# Patient Record
Sex: FEMALE | Race: WHITE | NOT HISPANIC OR LATINO | Employment: FULL TIME | URBAN - NONMETROPOLITAN AREA
[De-identification: names, ages, dates, MRNs, and addresses within clinical notes are randomized per-mention and may not be internally consistent; named-entity substitution may affect disease eponyms.]

---

## 2021-12-25 ENCOUNTER — APPOINTMENT (EMERGENCY)
Dept: RADIOLOGY | Facility: HOSPITAL | Age: 41
End: 2021-12-25
Payer: COMMERCIAL

## 2021-12-25 ENCOUNTER — HOSPITAL ENCOUNTER (EMERGENCY)
Facility: HOSPITAL | Age: 41
Discharge: HOME | End: 2021-12-25
Attending: EMERGENCY MEDICINE
Payer: COMMERCIAL

## 2021-12-25 VITALS
TEMPERATURE: 98.7 F | WEIGHT: 153.22 LBS | HEIGHT: 67 IN | SYSTOLIC BLOOD PRESSURE: 178 MMHG | HEART RATE: 68 BPM | RESPIRATION RATE: 18 BRPM | DIASTOLIC BLOOD PRESSURE: 95 MMHG | BODY MASS INDEX: 24.05 KG/M2 | OXYGEN SATURATION: 100 %

## 2021-12-25 DIAGNOSIS — J02.9 VIRAL PHARYNGITIS: Primary | ICD-10-CM

## 2021-12-25 LAB
FLUAV RNA RESP QL NAA+PROBE: NEGATIVE
FLUBV RNA RESP QL NAA+PROBE: NEGATIVE
RSV RNA RESP QL NAA+PROBE: NEGATIVE
S PYO DNA THROAT QL NAA+PROBE: NORMAL
SARS-COV-2 RNA RESP QL NAA+PROBE: NEGATIVE

## 2021-12-25 PROCEDURE — 71046 X-RAY EXAM CHEST 2 VIEWS: CPT

## 2021-12-25 PROCEDURE — 99285 EMERGENCY DEPT VISIT HI MDM: CPT | Performed by: EMERGENCY MEDICINE

## 2021-12-25 PROCEDURE — 0241U HB NFCT DS VIR RESP RNA 4 TRGT: CPT | Performed by: EMERGENCY MEDICINE

## 2021-12-25 PROCEDURE — 99283 EMERGENCY DEPT VISIT LOW MDM: CPT

## 2021-12-25 PROCEDURE — 87651 STREP A DNA AMP PROBE: CPT | Performed by: EMERGENCY MEDICINE

## 2021-12-25 PROCEDURE — 96372 THER/PROPH/DIAG INJ SC/IM: CPT

## 2021-12-25 RX ORDER — KETOROLAC TROMETHAMINE 30 MG/ML
30 INJECTION, SOLUTION INTRAMUSCULAR; INTRAVENOUS ONCE
Status: COMPLETED | OUTPATIENT
Start: 2021-12-25 | End: 2021-12-25

## 2021-12-25 RX ORDER — DIPHENOXYLATE HYDROCHLORIDE AND ATROPINE SULFATE 2.5; .025 MG/1; MG/1
1 TABLET ORAL DAILY
COMMUNITY

## 2021-12-25 RX ORDER — IPRATROPIUM BROMIDE AND ALBUTEROL SULFATE 2.5; .5 MG/3ML; MG/3ML
3 SOLUTION RESPIRATORY (INHALATION) ONCE
Status: COMPLETED | OUTPATIENT
Start: 2021-12-25 | End: 2021-12-25

## 2021-12-25 RX ORDER — LACOSAMIDE 50 MG/1
100 TABLET ORAL 2 TIMES DAILY
COMMUNITY
Start: 2021-11-04 | End: 2022-11-04

## 2021-12-25 RX ADMIN — IPRATROPIUM BROMIDE AND ALBUTEROL SULFATE 3 ML: .5; 3 SOLUTION RESPIRATORY (INHALATION) at 15:50

## 2021-12-25 RX ADMIN — KETOROLAC TROMETHAMINE 30 MG: 30 INJECTION, SOLUTION INTRAMUSCULAR at 15:41

## 2022-04-05 ENCOUNTER — APPOINTMENT (EMERGENCY)
Dept: CT IMAGING | Facility: HOSPITAL | Age: 42
End: 2022-04-05
Payer: COMMERCIAL

## 2022-04-05 ENCOUNTER — HOSPITAL ENCOUNTER (EMERGENCY)
Facility: HOSPITAL | Age: 42
Discharge: HOME | End: 2022-04-05
Attending: EMERGENCY MEDICINE
Payer: COMMERCIAL

## 2022-04-05 VITALS
OXYGEN SATURATION: 99 % | TEMPERATURE: 98.1 F | DIASTOLIC BLOOD PRESSURE: 92 MMHG | HEART RATE: 56 BPM | WEIGHT: 172.18 LBS | BODY MASS INDEX: 26.97 KG/M2 | RESPIRATION RATE: 18 BRPM | SYSTOLIC BLOOD PRESSURE: 150 MMHG

## 2022-04-05 DIAGNOSIS — G40.909 SEIZURE DISORDER (HCC): Primary | ICD-10-CM

## 2022-04-05 DIAGNOSIS — G40.909 RECURRENT SEIZURES (HCC): ICD-10-CM

## 2022-04-05 LAB
ALBUMIN SERPL BCP-MCNC: 4 G/DL (ref 3.5–5)
ALP SERPL-CCNC: 55 U/L (ref 46–116)
ALT SERPL W P-5'-P-CCNC: 20 U/L (ref 12–78)
ANION GAP SERPL CALCULATED.3IONS-SCNC: 7 MMOL/L (ref 4–13)
APTT PPP: 32 SECONDS (ref 23–37)
AST SERPL W P-5'-P-CCNC: 16 U/L (ref 5–45)
BASOPHILS # BLD AUTO: 0.04 THOUSANDS/ΜL (ref 0–0.1)
BASOPHILS NFR BLD AUTO: 1 % (ref 0–1)
BILIRUB SERPL-MCNC: 0.31 MG/DL (ref 0.2–1)
BILIRUB UR QL STRIP: NEGATIVE
BUN SERPL-MCNC: 12 MG/DL (ref 5–25)
CALCIUM SERPL-MCNC: 8.3 MG/DL (ref 8.3–10.1)
CHLORIDE SERPL-SCNC: 104 MMOL/L (ref 100–108)
CLARITY UR: CLEAR
CO2 SERPL-SCNC: 27 MMOL/L (ref 21–32)
COLOR UR: YELLOW
CREAT SERPL-MCNC: 1.05 MG/DL (ref 0.6–1.3)
EOSINOPHIL # BLD AUTO: 0.04 THOUSAND/ΜL (ref 0–0.61)
EOSINOPHIL NFR BLD AUTO: 1 % (ref 0–6)
ERYTHROCYTE [DISTWIDTH] IN BLOOD BY AUTOMATED COUNT: 12.6 % (ref 11.6–15.1)
EXT PREG TEST URINE: NEGATIVE
EXT. CONTROL ED NAV: NORMAL
GFR SERPL CREATININE-BSD FRML MDRD: 65 ML/MIN/1.73SQ M
GLUCOSE SERPL-MCNC: 81 MG/DL (ref 65–140)
GLUCOSE SERPL-MCNC: 87 MG/DL (ref 65–140)
GLUCOSE UR STRIP-MCNC: NEGATIVE MG/DL
HCT VFR BLD AUTO: 43.8 % (ref 34.8–46.1)
HGB BLD-MCNC: 14.1 G/DL (ref 11.5–15.4)
HGB UR QL STRIP.AUTO: NEGATIVE
IMM GRANULOCYTES # BLD AUTO: 0.03 THOUSAND/UL (ref 0–0.2)
IMM GRANULOCYTES NFR BLD AUTO: 0 % (ref 0–2)
INR PPP: 1.02 (ref 0.84–1.19)
KETONES UR STRIP-MCNC: ABNORMAL MG/DL
LEUKOCYTE ESTERASE UR QL STRIP: NEGATIVE
LYMPHOCYTES # BLD AUTO: 2.15 THOUSANDS/ΜL (ref 0.6–4.47)
LYMPHOCYTES NFR BLD AUTO: 27 % (ref 14–44)
MCH RBC QN AUTO: 29.5 PG (ref 26.8–34.3)
MCHC RBC AUTO-ENTMCNC: 32.2 G/DL (ref 31.4–37.4)
MCV RBC AUTO: 92 FL (ref 82–98)
MONOCYTES # BLD AUTO: 0.54 THOUSAND/ΜL (ref 0.17–1.22)
MONOCYTES NFR BLD AUTO: 7 % (ref 4–12)
NEUTROPHILS # BLD AUTO: 5.13 THOUSANDS/ΜL (ref 1.85–7.62)
NEUTS SEG NFR BLD AUTO: 64 % (ref 43–75)
NITRITE UR QL STRIP: NEGATIVE
NRBC BLD AUTO-RTO: 0 /100 WBCS
PH UR STRIP.AUTO: 7 [PH]
PLATELET # BLD AUTO: 238 THOUSANDS/UL (ref 149–390)
PMV BLD AUTO: 11 FL (ref 8.9–12.7)
POTASSIUM SERPL-SCNC: 3.8 MMOL/L (ref 3.5–5.3)
PROT SERPL-MCNC: 7.1 G/DL (ref 6.4–8.2)
PROT UR STRIP-MCNC: NEGATIVE MG/DL
PROTHROMBIN TIME: 13.3 SECONDS (ref 11.6–14.5)
RBC # BLD AUTO: 4.78 MILLION/UL (ref 3.81–5.12)
SODIUM SERPL-SCNC: 138 MMOL/L (ref 136–145)
SP GR UR STRIP.AUTO: 1.02 (ref 1–1.03)
UROBILINOGEN UR QL STRIP.AUTO: 0.2 E.U./DL
WBC # BLD AUTO: 7.93 THOUSAND/UL (ref 4.31–10.16)

## 2022-04-05 PROCEDURE — 72125 CT NECK SPINE W/O DYE: CPT

## 2022-04-05 PROCEDURE — 93005 ELECTROCARDIOGRAM TRACING: CPT

## 2022-04-05 PROCEDURE — 81003 URINALYSIS AUTO W/O SCOPE: CPT | Performed by: EMERGENCY MEDICINE

## 2022-04-05 PROCEDURE — 85025 COMPLETE CBC W/AUTO DIFF WBC: CPT | Performed by: EMERGENCY MEDICINE

## 2022-04-05 PROCEDURE — 70450 CT HEAD/BRAIN W/O DYE: CPT

## 2022-04-05 PROCEDURE — 85610 PROTHROMBIN TIME: CPT | Performed by: EMERGENCY MEDICINE

## 2022-04-05 PROCEDURE — 36415 COLL VENOUS BLD VENIPUNCTURE: CPT | Performed by: EMERGENCY MEDICINE

## 2022-04-05 PROCEDURE — 99285 EMERGENCY DEPT VISIT HI MDM: CPT

## 2022-04-05 PROCEDURE — 96361 HYDRATE IV INFUSION ADD-ON: CPT

## 2022-04-05 PROCEDURE — 80053 COMPREHEN METABOLIC PANEL: CPT | Performed by: EMERGENCY MEDICINE

## 2022-04-05 PROCEDURE — 85730 THROMBOPLASTIN TIME PARTIAL: CPT | Performed by: EMERGENCY MEDICINE

## 2022-04-05 PROCEDURE — 81025 URINE PREGNANCY TEST: CPT | Performed by: EMERGENCY MEDICINE

## 2022-04-05 PROCEDURE — 82948 REAGENT STRIP/BLOOD GLUCOSE: CPT

## 2022-04-05 PROCEDURE — 96374 THER/PROPH/DIAG INJ IV PUSH: CPT

## 2022-04-05 PROCEDURE — 80235 DRUG ASSAY LACOSAMIDE: CPT | Performed by: EMERGENCY MEDICINE

## 2022-04-05 PROCEDURE — 99284 EMERGENCY DEPT VISIT MOD MDM: CPT | Performed by: EMERGENCY MEDICINE

## 2022-04-05 RX ORDER — MIDAZOLAM HYDROCHLORIDE 1 MG/ML
1 INJECTION INTRAMUSCULAR; INTRAVENOUS ONCE
Status: COMPLETED | OUTPATIENT
Start: 2022-04-05 | End: 2022-04-05

## 2022-04-05 RX ORDER — KETOROLAC TROMETHAMINE 30 MG/ML
30 INJECTION, SOLUTION INTRAMUSCULAR; INTRAVENOUS ONCE
Status: COMPLETED | OUTPATIENT
Start: 2022-04-05 | End: 2022-04-05

## 2022-04-05 RX ADMIN — KETOROLAC TROMETHAMINE 30 MG: 30 INJECTION, SOLUTION INTRAMUSCULAR at 12:45

## 2022-04-05 RX ADMIN — SODIUM CHLORIDE 1000 ML: 0.9 INJECTION, SOLUTION INTRAVENOUS at 11:09

## 2022-04-05 NOTE — ED NOTES
This RN entered room, EMS not in room, pt left unattended, EMS had left pt in room prior to giving report  Pt found to be unresponsive only to painful stimuli, EMS then found for report and EMS reports pt had been given 2mg versed due to having seizure en-route  Pt post-ictal and only responds verbally after repeated painful stimulation  Charge called and asked if report was given, per Joaquina Aburto RN no report was given to him either          Kayla Kline, TIBURCIO  04/05/22 5714

## 2022-04-05 NOTE — ED PROVIDER NOTES
History  Chief Complaint   Patient presents with    Seizure - Prior Hx Of     has seizure history, has not had one for over a year, today had one at work  slid down to the ground and now has neck pain  Patient is a 43-year-old female with history of epilepsy who presents the emergency department due to seizure which occurred at work today patient had a generalized seizures lump back in chair and fell to the ground upon EMS arrival she did complain of neck pain no other trauma or injury  The patient did have a subsequent seizure in route to hospital with EMS was given 2 mg of Versed and seizure has  Patient is now tired but alert and responsive and oriented x3  History provided by:  Patient and EMS personnel  Seizure Re-Evaluation  Seizure activity on arrival: no    Seizure type:  Grand mal  Postictal symptoms: somnolence    Return to baseline: yes    Severity:  Moderate  Duration:  1 minute  Timing:  Clustered  Number of seizures this episode:  2  Context: not change in medication, not sleeping less and medical compliance    Recent head injury:  During the event  PTA treatment:  Midazolam  History of seizures: yes        Prior to Admission Medications   Prescriptions Last Dose Informant Patient Reported? Taking?   lacosamide (VIMPAT) 50 mg   Yes No   Sig: Take 100 mg by mouth 2 (two) times a day   multivitamin (THERAGRAN) TABS   Yes No   Sig: Take 1 tablet by mouth daily      Facility-Administered Medications: None       Past Medical History:   Diagnosis Date    Epilepsy ([facility] )     Seizure ([facility] )        History reviewed  No pertinent surgical history  History reviewed  No pertinent family history  I have reviewed and agree with the history as documented      E-Cigarette/Vaping    E-Cigarette Use Never User      E-Cigarette/Vaping Substances     Social History     Tobacco Use    Smoking status: Never Smoker    Smokeless tobacco: Never Used   Vaping Use    Vaping Use: Never used   Substance Use Topics    Alcohol use: Not Currently    Drug use: Not Currently       Review of Systems   Constitutional: Negative for activity change, appetite change, chills, fatigue and fever  HENT: Negative for congestion, ear pain, rhinorrhea and sore throat  Eyes: Negative for discharge, redness and visual disturbance  Respiratory: Negative for cough, chest tightness, shortness of breath and wheezing  Cardiovascular: Negative for chest pain and palpitations  Gastrointestinal: Negative for abdominal pain, constipation, diarrhea, nausea and vomiting  Endocrine: Negative for polydipsia and polyuria  Genitourinary: Negative for difficulty urinating, dysuria, frequency, hematuria and urgency  Musculoskeletal: Positive for neck pain  Negative for arthralgias and myalgias  Skin: Negative for color change, pallor and rash  Neurological: Positive for seizures  Negative for dizziness, weakness, light-headedness, numbness and headaches  Hematological: Negative for adenopathy  Does not bruise/bleed easily  All other systems reviewed and are negative  Physical Exam  Physical Exam  Vitals and nursing note reviewed  Constitutional:       Appearance: She is well-developed  HENT:      Head: Normocephalic and atraumatic  Right Ear: External ear normal       Left Ear: External ear normal       Nose: Nose normal    Eyes:      Conjunctiva/sclera: Conjunctivae normal       Pupils: Pupils are equal, round, and reactive to light  Cardiovascular:      Rate and Rhythm: Normal rate and regular rhythm  Heart sounds: Normal heart sounds  Pulmonary:      Effort: Pulmonary effort is normal  No respiratory distress  Breath sounds: Normal breath sounds  No wheezing or rales  Chest:      Chest wall: No tenderness  Abdominal:      General: Bowel sounds are normal  There is no distension  Palpations: Abdomen is soft  Tenderness: There is no abdominal tenderness  There is no guarding  Musculoskeletal:         General: Normal range of motion  Cervical back: Normal range of motion and neck supple  Muscular tenderness present  No spinous process tenderness  Skin:     General: Skin is warm and dry  Neurological:      Mental Status: She is alert and oriented to person, place, and time  Cranial Nerves: No cranial nerve deficit  Sensory: No sensory deficit           Vital Signs  ED Triage Vitals   Temperature Pulse Respirations Blood Pressure SpO2   04/05/22 1200 04/05/22 1048 04/05/22 1105 04/05/22 1048 04/05/22 1048   98 1 °F (36 7 °C) 87 18 (!) 189/101 97 %      Temp Source Heart Rate Source Patient Position - Orthostatic VS BP Location FiO2 (%)   04/05/22 1200 -- -- -- --   Temporal          Pain Score       --                  Vitals:    04/05/22 1048 04/05/22 1105 04/05/22 1200   BP: (!) 189/101  134/78   Pulse: 87 65 (!) 54         Visual Acuity      ED Medications  Medications   ketorolac (TORADOL) injection 30 mg (has no administration in time range)   sodium chloride 0 9 % bolus 1,000 mL (1,000 mL Intravenous New Bag 4/5/22 1109)   midazolam (FOR EMS ONLY) (VERSED) 2 mg/2 mL injection 2 mg (0 mg Does not apply Given to EMS 4/5/22 1100)       Diagnostic Studies  Results Reviewed     Procedure Component Value Units Date/Time    UA w Reflex to Microscopic w Reflex to Culture [[id number]]  (Abnormal) Collected: 04/05/22 1102    Lab Status: Final result Specimen: Urine Updated: 04/05/22 1142     Color, UA Yellow     Clarity, UA Clear     Specific Peck, UA 1 020     pH, UA 7 0     Leukocytes, UA Negative     Nitrite, UA Negative     Protein, UA Negative mg/dl      Glucose, UA Negative mg/dl      Ketones, UA Trace mg/dl      Urobilinogen, UA 0 2 E U /dl      Bilirubin, UA Negative     Blood, UA Negative    POCT pregnancy, urine [[id number]]  (Normal) Resulted: 04/05/22 1135    Lab Status: Final result Updated: 04/05/22 1135     EXT PREG TEST UR (Ref: Negative) negative Control valid    Comprehensive metabolic panel [[id number]] Collected: 04/05/22 1107    Lab Status: Final result Specimen: Blood from Arm, Left Updated: 04/05/22 1130     Sodium 138 mmol/L      Potassium 3 8 mmol/L      Chloride 104 mmol/L      CO2 27 mmol/L      ANION GAP 7 mmol/L      BUN 12 mg/dL      Creatinine 1 05 mg/dL      Glucose 87 mg/dL      Calcium 8 3 mg/dL      AST 16 U/L      ALT 20 U/L      Alkaline Phosphatase 55 U/L      Total Protein 7 1 g/dL      Albumin 4 0 g/dL      Total Bilirubin 0 31 mg/dL      eGFR 65 ml/min/1 73sq m     Narrative:      National Kid[employer] Disease [employer] guidelines for Chronic Kidney Disease (CKD):     Stage 1 with normal or high GFR (GFR > 90 mL/min/1 73 square meters)    Stage 2 Mild CKD (GFR = 60-89 mL/min/1 73 square meters)    Stage 3A Moderate CKD (GFR = 45-59 mL/min/1 73 square meters)    Stage 3B Moderate CKD (GFR = 30-44 mL/min/1 73 square meters)    Stage 4 Severe CKD (GFR = 15-29 mL/min/1 73 square meters)    Stage 5 End Stage CKD (GFR <15 mL/min/1 73 square meters)  Note: GFR calculation is accurate only with a steady state creatinine    Protime-INR [[id number]]  (Normal) Collected: 04/05/22 1107    Lab Status: Final result Specimen: Blood from Arm, Left Updated: 04/05/22 1126     Protime 13 3 seconds      INR 1 02    APTT [[id number]]  (Normal) Collected: 04/05/22 1107    Lab Status: Final result Specimen: Blood from Arm, Left Updated: 04/05/22 1126     PTT 32 seconds     CBC and differential [[id number]] Collected: 04/05/22 1107    Lab Status: Final result Specimen: Blood from Arm, Left Updated: 04/05/22 1112     WBC 7 93 Thousand/uL      RBC 4 78 Million/uL      Hemoglobin 14 1 g/dL      Hematocrit 43 8 %      MCV 92 fL      MCH 29 5 pg      MCHC 32 2 g/dL      RDW 12 6 %      MPV 11 0 fL      Platelets 785 Thousands/uL      nRBC 0 /100 WBCs      Neutrophils Relative 64 %      Immat GRANS % 0 %      Lymphocytes Relative 27 %      Monocytes Relative 7 % Eosinophils Relative 1 %      Basophils Relative 1 %      Neutrophils Absolute 5 13 Thousands/µL      Immature Grans Absolute 0 03 Thousand/uL      Lymphocytes Absolute 2 15 Thousands/µL      Monocytes Absolute 0 54 Thousand/µL      Eosinophils Absolute 0 04 Thousand/µL      Basophils Absolute 0 04 Thousands/µL     Lacosamide [[id number]] Collected: 04/05/22 1107    Lab Status: In process Specimen: Blood from Arm, Left Updated: 04/05/22 1108    Fingerstick Glucose (POCT) [[id number]]  (Normal) Collected: 04/05/22 1056    Lab Status: Final result Updated: 04/05/22 1056     POC Glucose 81 mg/dl                  CT head without contrast   Final Result by Rich Spear MD (04/05 1158)      No acute intracranial abnormality  Workstation performed: [id number]         CT cervical spine without contrast   Final Result by Rich Spear MD (04/05 1200)      No cervical spine fracture or traumatic malalignment  Workstation performed: [id number]                    Procedures  Procedures         ED Course  ED Course as of 04/05/22 1215   Tue Apr 05, 2022   1208 Cervical Collar Clearance: The patient had a CT scan of the cervical spine demonstrating no acute injury  On exam, the patient had no midline point tenderness or paresthesias/numbness/weakness in the extremities  The patient had full range of motion (was then able to flex, extend, and rotate head laterally) without pain  There were no distracting injuries and the patient was not intoxicated  The patient's cervical spine was cleared radiologically and clinically  Cervical collar removed at this time  Ellyn Parson DO  4/5/2022 12:08 PM                                    SBIRT 20yo+      Most Recent Value   SBIRT (25 yo +)    In order to provide better care to our patients, we are screening all of our patients for alcohol and drug use  Would it be okay to ask you these screening questions?  Yes Filed at: 04/05/2022 1108   Initial Alcohol Screen: US AUDIT-C     1  How often do you have a drink containing alcohol? 0 Filed at: 04/05/2022 1108   2  How many drinks containing alcohol do you have on a typical day you are drinking? 0 Filed at: 04/05/2022 1108   3a  Male UNDER 65: How often do you have five or more drinks on one occasion? 0 Filed at: 04/05/2022 1108   3b  FEMALE Any Age, or MALE 65+: How often do you have 4 or more drinks on one occassion? 0 Filed at: 04/05/2022 1108   Audit-C Score 0 Filed at: 04/05/2022 1108   DAVIDA: How many times in the past year have you    Used an illegal drug or used a prescription medication for non-medical reasons? Never Filed at: 04/05/2022 1108                    MDM  Number of Diagnoses or Management Options  Recurrent seizures [facility]): new and requires workup  Seizure disorder [facility]): new and requires workup  Diagnosis management comments: Patient remained clinically and hemodynamically stable in the emergency department she is afebrile nontoxic well-appearing mental status has returned to baseline she has some aches and pains all over from the seizure activity no further seizure activity throughout observation monitoring in the ED  Patient now feels improved and remained stable and appropriate for discharge advised outpatient follow-up with PCP and Neurology and continue all current prescribe seizure meds  Based on clinical scenario and results and findings in the ED as well as prior EEG testing doubt true epileptic seizure  Return precautions and anticipatory guidance discussed           Amount and/or Complexity of Data Reviewed  Clinical lab tests: ordered and reviewed  Tests in the radiology section of CPT®: ordered and reviewed  Tests in the medicine section of CPT®: ordered and reviewed  Decide to obtain previous medical records or to obtain history from someone other than the patient: yes  Review and summarize past medical records: yes  Independent visualization of images, tracings, or specimens: yes    Risk of Complications, Morbidity, and/or Mortality  Presenting problems: moderate  Diagnostic procedures: moderate  Management options: moderate    Patient Progress  Patient progress: stable      Disposition  Final diagnoses:   Seizure disorder ([facility] )   Recurrent seizures ([facility] )     Time reflects when diagnosis was documented in both MDM as applicable and the Disposition within this note     Time User Action Codes Description Comment    4/5/2022 12:09 PM Ephriam Orchard [[id number]] Seizure disorder ([facility] )     4/5/2022 12:09 PM David Hassankathi Add [[id number]] Recurrent seizures [facility])       ED Disposition     ED Disposition Condition Date/Time Comment    Discharge Stable Tue Apr 5, 2022 12:09 PM Yrn Carty discharge to home/self care  Follow-up Information     Follow up With Specialties Details Why Contact Info    Raymond Driver MD  Schedule an appointment as soon as possible for a visit in 3 days  [address] [address]  [phone]        Schedule an appointment as soon as possible for a visit in 3 days  Your neurologist          Patient's Medications   Discharge Prescriptions    No medications on file       No discharge procedures on file      PDMP Review     None          ED Provider  Electronically Signed by           Vera Cuadra DO  04/05/22 1354

## 2022-04-10 LAB — LACOSAMIDE SERPL-MCNC: 2.6 UG/ML (ref 5–10)

## 2022-04-11 LAB
ATRIAL RATE: 250 BPM
P AXIS: 61 DEGREES
QRS AXIS: 66 DEGREES
QRSD INTERVAL: 86 MS
QT INTERVAL: 394 MS
QTC INTERVAL: 431 MS
T WAVE AXIS: 34 DEGREES
VENTRICULAR RATE: 72 BPM

## 2022-04-11 PROCEDURE — 93010 ELECTROCARDIOGRAM REPORT: CPT | Performed by: INTERNAL MEDICINE

## 2023-05-04 ENCOUNTER — HOSPITAL ENCOUNTER (OUTPATIENT)
Facility: HOSPITAL | Age: 43
Setting detail: OBSERVATION
Discharge: HOME | End: 2023-05-05
Attending: EMERGENCY MEDICINE | Admitting: FAMILY MEDICINE

## 2023-05-04 ENCOUNTER — APPOINTMENT (OUTPATIENT)
Dept: CT IMAGING | Facility: HOSPITAL | Age: 43
End: 2023-05-04

## 2023-05-04 DIAGNOSIS — G40.919 BREAKTHROUGH SEIZURE (HCC): ICD-10-CM

## 2023-05-04 DIAGNOSIS — R56.9 SEIZURE-LIKE ACTIVITY (HCC): Primary | ICD-10-CM

## 2023-05-04 LAB
ALBUMIN SERPL BCP-MCNC: 4.2 G/DL (ref 3.5–5)
ALP SERPL-CCNC: 50 U/L (ref 34–104)
ALT SERPL W P-5'-P-CCNC: 14 U/L (ref 7–52)
ANION GAP SERPL CALCULATED.3IONS-SCNC: 7 MMOL/L (ref 4–13)
APTT PPP: 32 SECONDS (ref 23–37)
AST SERPL W P-5'-P-CCNC: 16 U/L (ref 13–39)
BASOPHILS # BLD AUTO: 0.06 THOUSANDS/ÂΜL (ref 0–0.1)
BASOPHILS NFR BLD AUTO: 1 % (ref 0–1)
BILIRUB SERPL-MCNC: 0.43 MG/DL (ref 0.2–1)
BILIRUB UR QL STRIP: NEGATIVE
BUN SERPL-MCNC: 14 MG/DL (ref 5–25)
CALCIUM SERPL-MCNC: 9.1 MG/DL (ref 8.4–10.2)
CHLORIDE SERPL-SCNC: 106 MMOL/L (ref 96–108)
CK SERPL-CCNC: 82 U/L (ref 26–192)
CLARITY UR: CLEAR
CO2 SERPL-SCNC: 25 MMOL/L (ref 21–32)
COLOR UR: YELLOW
CREAT SERPL-MCNC: 0.92 MG/DL (ref 0.6–1.3)
EOSINOPHIL # BLD AUTO: 0.1 THOUSAND/ÂΜL (ref 0–0.61)
EOSINOPHIL NFR BLD AUTO: 1 % (ref 0–6)
ERYTHROCYTE [DISTWIDTH] IN BLOOD BY AUTOMATED COUNT: 13.2 % (ref 11.6–15.1)
EXT PREGNANCY TEST URINE: NEGATIVE
EXT. CONTROL: NORMAL
GFR SERPL CREATININE-BSD FRML MDRD: 76 ML/MIN/1.73SQ M
GLUCOSE SERPL-MCNC: 100 MG/DL (ref 65–140)
GLUCOSE SERPL-MCNC: 93 MG/DL (ref 65–140)
GLUCOSE UR STRIP-MCNC: NEGATIVE MG/DL
HCT VFR BLD AUTO: 44.5 % (ref 34.8–46.1)
HGB BLD-MCNC: 14.1 G/DL (ref 11.5–15.4)
HGB UR QL STRIP.AUTO: NEGATIVE
IMM GRANULOCYTES # BLD AUTO: 0.03 THOUSAND/UL (ref 0–0.2)
IMM GRANULOCYTES NFR BLD AUTO: 0 % (ref 0–2)
INR PPP: 0.99 (ref 0.84–1.19)
KETONES UR STRIP-MCNC: NEGATIVE MG/DL
LEUKOCYTE ESTERASE UR QL STRIP: NEGATIVE
LYMPHOCYTES # BLD AUTO: 3.62 THOUSANDS/ÂΜL (ref 0.6–4.47)
LYMPHOCYTES NFR BLD AUTO: 46 % (ref 14–44)
MCH RBC QN AUTO: 29.3 PG (ref 26.8–34.3)
MCHC RBC AUTO-ENTMCNC: 31.7 G/DL (ref 31.4–37.4)
MCV RBC AUTO: 93 FL (ref 82–98)
MONOCYTES # BLD AUTO: 0.65 THOUSAND/ÂΜL (ref 0.17–1.22)
MONOCYTES NFR BLD AUTO: 8 % (ref 4–12)
NEUTROPHILS # BLD AUTO: 3.52 THOUSANDS/ÂΜL (ref 1.85–7.62)
NEUTS SEG NFR BLD AUTO: 44 % (ref 43–75)
NITRITE UR QL STRIP: NEGATIVE
NRBC BLD AUTO-RTO: 0 /100 WBCS
PH UR STRIP.AUTO: 7 [PH]
PLATELET # BLD AUTO: 240 THOUSANDS/UL (ref 149–390)
PMV BLD AUTO: 10.6 FL (ref 8.9–12.7)
POTASSIUM SERPL-SCNC: 3.7 MMOL/L (ref 3.5–5.3)
PROT SERPL-MCNC: 6.6 G/DL (ref 6.4–8.4)
PROT UR STRIP-MCNC: NEGATIVE MG/DL
PROTHROMBIN TIME: 13.2 SECONDS (ref 11.6–14.5)
RBC # BLD AUTO: 4.81 MILLION/UL (ref 3.81–5.12)
SODIUM SERPL-SCNC: 138 MMOL/L (ref 135–147)
SP GR UR STRIP.AUTO: 1.02 (ref 1–1.03)
UROBILINOGEN UR QL STRIP.AUTO: 0.2 E.U./DL
WBC # BLD AUTO: 7.98 THOUSAND/UL (ref 4.31–10.16)

## 2023-05-04 RX ORDER — SODIUM CHLORIDE, SODIUM GLUCONATE, SODIUM ACETATE, POTASSIUM CHLORIDE, MAGNESIUM CHLORIDE, SODIUM PHOSPHATE, DIBASIC, AND POTASSIUM PHOSPHATE .53; .5; .37; .037; .03; .012; .00082 G/100ML; G/100ML; G/100ML; G/100ML; G/100ML; G/100ML; G/100ML
100 INJECTION, SOLUTION INTRAVENOUS CONTINUOUS
Status: DISCONTINUED | OUTPATIENT
Start: 2023-05-04 | End: 2023-05-05 | Stop reason: HOSPADM

## 2023-05-04 RX ORDER — ACETAMINOPHEN 325 MG/1
650 TABLET ORAL EVERY 6 HOURS PRN
Status: DISCONTINUED | OUTPATIENT
Start: 2023-05-04 | End: 2023-05-05 | Stop reason: HOSPADM

## 2023-05-04 RX ORDER — LACOSAMIDE 100 MG/1
200 TABLET ORAL EVERY 12 HOURS SCHEDULED
Status: DISCONTINUED | OUTPATIENT
Start: 2023-05-05 | End: 2023-05-05 | Stop reason: HOSPADM

## 2023-05-04 RX ORDER — LORAZEPAM 2 MG/ML
2 INJECTION INTRAMUSCULAR ONCE
Status: COMPLETED | OUTPATIENT
Start: 2023-05-04 | End: 2023-05-04

## 2023-05-04 RX ORDER — LACOSAMIDE 150 MG/1
300 TABLET ORAL ONCE
Status: COMPLETED | OUTPATIENT
Start: 2023-05-04 | End: 2023-05-04

## 2023-05-04 RX ORDER — ONDANSETRON 2 MG/ML
4 INJECTION INTRAMUSCULAR; INTRAVENOUS EVERY 6 HOURS PRN
Status: DISCONTINUED | OUTPATIENT
Start: 2023-05-04 | End: 2023-05-05 | Stop reason: HOSPADM

## 2023-05-04 RX ADMIN — LORAZEPAM 2 MG: 2 INJECTION INTRAMUSCULAR; INTRAVENOUS at 19:30

## 2023-05-04 RX ADMIN — LACOSAMIDE 300 MG: 150 TABLET, FILM COATED ORAL at 20:45

## 2023-05-04 RX ADMIN — SODIUM CHLORIDE 1000 ML: 0.9 INJECTION, SOLUTION INTRAVENOUS at 18:48

## 2023-05-04 NOTE — ED PROVIDER NOTES
" Seizure - Prior Hx Of       Patient brought in by EMS for seizures        Rita Tillman, 36 yo female presented to the ED for evaluation status post seizure  Transported via EMS  Postictal state on arrival   She was following with reading neuro after first seziludwin in 2020, due to this neurologist passing away patient was subsequently seen at [facility] in dec for continued seizures  Per notes \"She was initially tried on levetiracetam but had side effects  She was transitioned to lacosamide  \"  Due to continued breakthrough seizures, [facility] neurologist increased close of my dose to 150 mg twice daily  Patient states she has been taking this medication as prescribed  She did not remember seizure today  Friend states she was at the patient's home when patient called her upstairs  States she went upstairs to the patient's bedroom and patient had a \"small seizure\" she had a postictal  state they took her down stairs and about 20 minutes later had a \"large seizure\" foaming at the mouth, hands contracted, shaking  There was no loss of bowel or bladder control  There was no tongue injury  Family denies any head strike  Patient denies any recent URI symptoms  No fevers  No complaints on arrival   Patient states she is typically able to feel seizures coming on however cannot recall the seizure coming on            History provided by:  Patient, EMS personnel and friend        Prior to Admission Medications   Prescriptions Last Dose Informant Patient Reported? Taking?   lacosamide (VIMPAT) 50 mg     Yes No   Sig: Take 100 mg by mouth 2 (two) times a day   multivitamin (THERAGRAN) TABS     Yes No   Sig: Take 1 tablet by mouth daily      Facility-Administered Medications: None         Medical History        Past Medical History:   Diagnosis Date    Epilepsy ([facility] [address] )      Seizure [facility])              Surgical History   History reviewed  No pertinent surgical history         Family History   History reviewed   No pertinent " family history  I have reviewed and agree with the history as documented            E-Cigarette/Vaping    E-Cigarette Use Never User        E-Cigarette/Vaping Substances      Social History           Tobacco Use    Smoking status: Never    Smokeless tobacco: Never   Vaping Use    Vaping Use: Never used   Substance Use Topics    Alcohol use: Not Currently    Drug use: Not Currently         Review of Systems   Neurological: Positive for seizures  Negative for dizziness, tremors, syncope, facial asymmetry, speech difficulty, weakness, light-headedness, numbness and headaches  All other systems reviewed and are negative         Physical Exam  Physical Exam  Vitals and nursing note reviewed  Constitutional:       General: She is not in acute distress  Appearance: Normal appearance  She is not ill-appearing, toxic-appearing or diaphoretic  HENT:      Head: Normocephalic and atraumatic  Nose: Nose normal       Mouth/Throat:      Mouth: Mucous membranes are moist       Pharynx: Oropharynx is clear  No oropharyngeal exudate or posterior oropharyngeal erythema  Comments: No tongue injury  Eyes:      Extraocular Movements: Extraocular movements intact  Conjunctiva/sclera: Conjunctivae normal       Pupils: Pupils are equal, round, and reactive to light  Comments: No nystagmus   Cardiovascular:      Rate and Rhythm: Normal rate and regular rhythm  Pulmonary:      Effort: Pulmonary effort is normal       Breath sounds: Normal breath sounds  No stridor  No wheezing, rhonchi or rales  Chest:      Chest wall: No tenderness  Abdominal:      General: Abdomen is flat  Bowel sounds are normal  There is no distension  Palpations: Abdomen is soft  Tenderness: There is no abdominal tenderness  There is no guarding  Musculoskeletal:         General: Normal range of motion  Cervical back: Normal range of motion  No tenderness  Skin:     General: Skin is warm and dry  Neurological:      General: No focal deficit present  Mental Status: She is alert and oriented to person, place, and time  She is confused  GCS: GCS eye subscore is 4  GCS verbal subscore is 5  GCS motor subscore is 6  Cranial Nerves: No facial asymmetry  Sensory: Sensation is intact  Motor: Motor function is intact  No weakness  Comments: Appears tired and postictal  She is confused of the events today         Psychiatric:         Mood and Affect: Mood normal             Vital Signs         ED Triage Vitals [05/04/23 1830]   Temperature Pulse Respirations Blood Pressure SpO2   98 5 °F (36 9 °C) 65 18 163/86 99 %       Temp Source Heart Rate Source Patient Position - Orthostatic VS BP Location FiO2 (%)   Temporal Monitor Lying Left arm --       Pain Score           --                 Vitals         Vitals:     05/04/23 1830 05/04/23 1930 05/04/23 2000   BP: 163/86 (!) 180/81 134/76   Pulse: 65 78 59   Patient Position - Orthostatic VS: Lying Lying Lying               Visual Acuity      Visual Acuity    Flowsheet Row Most Recent Value   L Pupil Size (mm) 3   R Pupil Size (mm) 3             ED Medications  Medications   sodium chloride 0 9 % bolus 1,000 mL (1,000 mL Intravenous New Bag 5/4/23 1848)   LORazepam (ATIVAN) injection 2 mg (2 mg Intravenous Given 5/4/23 1930)   lacosamide (VIMPAT) tablet 300 mg (300 mg Oral Given 5/4/23 2045)         Diagnostic Studies          Results Reviewed      Procedure Component Value Units Date/Time     Protime-INR [[id number]]  (Normal) Collected: 05/04/23 1848     Lab Status: Final result Specimen: Blood from Arm, Left Updated: 05/04/23 1933       Protime 13 2 seconds         INR 0 99     APTT [[id number]]  (Normal) Collected: 05/04/23 1848     Lab Status: Final result Specimen: Blood from Arm, Left Updated: 05/04/23 1933       PTT 32 seconds       Comprehensive metabolic panel [[id number]] Collected: 05/04/23 1848     Lab Status: Final result Specimen: Blood from Arm, Left Updated: 05/04/23 1918       Sodium 138 mmol/L         Potassium 3 7 mmol/L         Chloride 106 mmol/L         CO2 25 mmol/L         ANION GAP 7 mmol/L         BUN 14 mg/dL         Creatinine 0 92 mg/dL         Glucose 100 mg/dL         Calcium 9 1 mg/dL         AST 16 U/L         ALT 14 U/L         Alkaline Phosphatase 50 U/L         Total Protein 6 6 g/dL         Albumin 4 2 g/dL         Total Bilirubin 0 43 mg/dL         eGFR 76 ml/min/1 73sq m       Narrative:       National Kidney Disease Foundation guidelines for Chronic Kidney Disease (CKD):     Stage 1 with normal or high GFR (GFR > 90 mL/min/1 73 square meters)    Stage 2 Mild CKD (GFR = 60-89 mL/min/1 73 square meters)    Stage 3A Moderate CKD (GFR = 45-59 mL/min/1 73 square meters)    Stage 3B Moderate CKD (GFR = 30-44 mL/min/1 73 square meters)    Stage 4 Severe CKD (GFR = 15-29 mL/min/1 73 square meters)    Stage 5 End Stage CKD (GFR <15 mL/min/1 73 square meters)  Note: GFR calculation is accurate only with a steady state creatinine     Fingerstick Glucose (POCT) [[id number]]  (Normal) Collected: 05/04/23 1852     Lab Status: Final result Updated: 05/04/23 1853       POC Glucose 93 mg/dl       CBC and differential [[id number]]  (Abnormal) Collected: 05/04/23 1848     Lab Status: Final result Specimen: Blood from Arm, Left Updated: 05/04/23 1853       WBC 7 98 Thousand/uL         RBC 4 81 Million/uL         Hemoglobin 14 1 g/dL         Hematocrit 44 5 %         MCV 93 fL         MCH 29 3 pg         MCHC 31 7 g/dL         RDW 13 2 %         MPV 10 6 fL         Platelets 783 Thousands/uL         nRBC 0 /100 WBCs         Neutrophils Relative 44 %         Immat GRANS % 0 %         Lymphocytes Relative 46 %         Monocytes Relative 8 %         Eosinophils Relative 1 %         Basophils Relative 1 %         Neutrophils Absolute 3 52 Thousands/µL         Immature Grans Absolute 0 03 Thousand/uL         Lymphocytes Absolute 3 62 "Thousands/µL         Monocytes Absolute 0 65 Thousand/µL         Eosinophils Absolute 0 10 Thousand/µL         Basophils Absolute 0 06 Thousands/µL       Lacosamide [[id number]] Collected: 05/04/23 1848     Lab Status: In process Specimen: Blood from Arm, Left Updated: 05/04/23 1850     UA (URINE) with reflex to Scope [[id number]]       Lab Status: No result Specimen: Urine       POCT pregnancy, urine [[id number]]       Lab Status: No result                      No orders to display                Procedures  Procedures      Seizure - Prior Hx Of       Patient brought in by EMS for seizures        Medinah Diss, 38 yo female presented to the ED for evaluation status post seizure  Transported via EMS  Postictal state on arrival   She was following with reading neuro after first seziure in 2020, due to this neurologist passing away patient was subsequently seen at [facility] in dec for continued seizures  Per notes \"She was initially tried on levetiracetam but had side effects  She was transitioned to lacosamide  \"  Due to continued breakthrough seizures, [facility] neurologist increased close of my dose to 150 mg twice daily  Patient states she has been taking this medication as prescribed  She did not remember seizure today  Friend states she was at the patient's home when patient called her upstairs  States she went upstairs to the patient's bedroom and patient had a \"small seizure\" she had a postictal  state they took her down stairs and about 20 minutes later had a \"large seizure\" foaming at the mouth, hands contracted, shaking  There was no loss of bowel or bladder control  There was no tongue injury  Family denies any head strike  Patient denies any recent URI symptoms  No fevers    No complaints on arrival   Patient states she is typically able to feel seizures coming on however cannot recall the seizure coming on            History provided by:  Patient, EMS personnel and friend        Prior to Admission " Medications   Prescriptions Last Dose Informant Patient Reported? Taking?   lacosamide (VIMPAT) 50 mg     Yes No   Sig: Take 100 mg by mouth 2 (two) times a day   multivitamin (THERAGRAN) TABS     Yes No   Sig: Take 1 tablet by mouth daily      Facility-Administered Medications: None         Medical History        Past Medical History:   Diagnosis Date    Epilepsy ([facility] [address] )      Seizure [facility])              Surgical History   History reviewed  No pertinent surgical history         Family History   History reviewed  No pertinent family history  I have reviewed and agree with the history as documented            E-Cigarette/Vaping    E-Cigarette Use Never User        E-Cigarette/Vaping Substances      Social History           Tobacco Use    Smoking status: Never    Smokeless tobacco: Never   Vaping Use    Vaping Use: Never used   Substance Use Topics    Alcohol use: Not Currently    Drug use: Not Currently         Review of Systems   Neurological: Positive for seizures  Negative for dizziness, tremors, syncope, facial asymmetry, speech difficulty, weakness, light-headedness, numbness and headaches  All other systems reviewed and are negative         Physical Exam  Physical Exam  Vitals and nursing note reviewed  Constitutional:       General: She is not in acute distress  Appearance: Normal appearance  She is not ill-appearing, toxic-appearing or diaphoretic  HENT:      Head: Normocephalic and atraumatic  Nose: Nose normal       Mouth/Throat:      Mouth: Mucous membranes are moist       Pharynx: Oropharynx is clear  No oropharyngeal exudate or posterior oropharyngeal erythema  Comments: No tongue injury  Eyes:      Extraocular Movements: Extraocular movements intact  Conjunctiva/sclera: Conjunctivae normal       Pupils: Pupils are equal, round, and reactive to light  Comments: No nystagmus   Cardiovascular:      Rate and Rhythm: Normal rate and regular rhythm     Pulmonary: Effort: Pulmonary effort is normal       Breath sounds: Normal breath sounds  No stridor  No wheezing, rhonchi or rales  Chest:      Chest wall: No tenderness  Abdominal:      General: Abdomen is flat  Bowel sounds are normal  There is no distension  Palpations: Abdomen is soft  Tenderness: There is no abdominal tenderness  There is no guarding  Musculoskeletal:         General: Normal range of motion  Cervical back: Normal range of motion  No tenderness  Skin:     General: Skin is warm and dry  Neurological:      General: No focal deficit present  Mental Status: She is alert and oriented to person, place, and time  She is confused  GCS: GCS eye subscore is 4  GCS verbal subscore is 5  GCS motor subscore is 6  Cranial Nerves: No facial asymmetry  Sensory: Sensation is intact  Motor: Motor function is intact  No weakness  Comments: Appears tired and postictal  She is confused of the events today         Psychiatric:         Mood and Affect: Mood normal             Vital Signs         ED Triage Vitals [05/04/23 1830]   Temperature Pulse Respirations Blood Pressure SpO2   98 5 °F (36 9 °C) 65 18 163/86 99 %       Temp Source Heart Rate Source Patient Position - Orthostatic VS BP Location FiO2 (%)   Temporal Monitor Lying Left arm --       Pain Score           --                 Vitals         Vitals:     05/04/23 1830 05/04/23 1930 05/04/23 2000   BP: 163/86 (!) 180/81 134/76   Pulse: 65 78 59   Patient Position - Orthostatic VS: Lying Lying Lying               Visual Acuity      Visual Acuity    Flowsheet Row Most Recent Value   L Pupil Size (mm) 3   R Pupil Size (mm) 3             ED Medications  Medications   sodium chloride 0 9 % bolus 1,000 mL (1,000 mL Intravenous New Bag 5/4/23 1848)   LORazepam (ATIVAN) injection 2 mg (2 mg Intravenous Given 5/4/23 1930)   lacosamide (VIMPAT) tablet 300 mg (300 mg Oral Given 5/4/23 2045)         Diagnostic Studies Results Reviewed      Procedure Component Value Units Date/Time     Protime-INR [[id number]]  (Normal) Collected: 05/04/23 1848     Lab Status: Final result Specimen: Blood from Arm, Left Updated: 05/04/23 1933       Protime 13 2 seconds         INR 0 99     APTT [[id number]]  (Normal) Collected: 05/04/23 1848     Lab Status: Final result Specimen: Blood from Arm, Left Updated: 05/04/23 1933       PTT 32 seconds       Comprehensive metabolic panel [[id number]] Collected: 05/04/23 1848     Lab Status: Final result Specimen: Blood from Arm, Left Updated: 05/04/23 1918       Sodium 138 mmol/L         Potassium 3 7 mmol/L         Chloride 106 mmol/L         CO2 25 mmol/L         ANION GAP 7 mmol/L         BUN 14 mg/dL         Creatinine 0 92 mg/dL         Glucose 100 mg/dL         Calcium 9 1 mg/dL         AST 16 U/L         ALT 14 U/L         Alkaline Phosphatase 50 U/L         Total Protein 6 6 g/dL         Albumin 4 2 g/dL         Total Bilirubin 0 43 mg/dL         eGFR 76 ml/min/1 73sq m       Narrative:       National Kidney Disease Foundation guidelines for Chronic Kidney Disease (CKD):     Stage 1 with normal or high GFR (GFR > 90 mL/min/1 73 square meters)    Stage 2 Mild CKD (GFR = 60-89 mL/min/1 73 square meters)    Stage 3A Moderate CKD (GFR = 45-59 mL/min/1 73 square meters)    Stage 3B Moderate CKD (GFR = 30-44 mL/min/1 73 square meters)    Stage 4 Severe CKD (GFR = 15-29 mL/min/1 73 square meters)    Stage 5 End Stage CKD (GFR <15 mL/min/1 73 square meters)  Note: GFR calculation is accurate only with a steady state creatinine     Fingerstick Glucose (POCT) [[id number]]  (Normal) Collected: 05/04/23 1852     Lab Status: Final result Updated: 05/04/23 1853       POC Glucose 93 mg/dl       CBC and differential [[id number]]  (Abnormal) Collected: 05/04/23 1848     Lab Status: Final result Specimen: Blood from Arm, Left Updated: 05/04/23 1853       WBC 7 98 Thousand/uL         RBC 4 81 Million/uL         Hemoglobin 14 1 g/dL         Hematocrit 44 5 %         MCV 93 fL         MCH 29 3 pg         MCHC 31 7 g/dL         RDW 13 2 %         MPV 10 6 fL         Platelets 014 Thousands/uL         nRBC 0 /100 WBCs         Neutrophils Relative 44 %         Immat GRANS % 0 %         Lymphocytes Relative 46 %         Monocytes Relative 8 %         Eosinophils Relative 1 %         Basophils Relative 1 %         Neutrophils Absolute 3 52 Thousands/µL         Immature Grans Absolute 0 03 Thousand/uL         Lymphocytes Absolute 3 62 Thousands/µL         Monocytes Absolute 0 65 Thousand/µL         Eosinophils Absolute 0 10 Thousand/µL         Basophils Absolute 0 06 Thousands/µL       Lacosamide [[id number]] Collected: 05/04/23 1848     Lab Status: In process Specimen: Blood from Arm, Left Updated: 05/04/23 1850     UA (URINE) with reflex to Scope [[id number]]       Lab Status: No result Specimen: Urine       POCT pregnancy, urine [[id number]]       Lab Status: No result                      No orders to display                Procedures  Procedures           ED Course  ED Course as of 05/04/23 2152   Thu May 04, 2023   1934 Patient was actively seizing  Resolved with 2 of Ativan  200 [facility] text sent to epileptologist   2017 [facility] - epileptologist recommended  give lacosamide 300 mg now and increase maintenance to 200 mg bid  Would consider loading with valproate if there are additional events  It doesn't seem that the diagnosis has been confirmed, so nonepileptic events remain in the differential   She probably should be watched overnight at this point to make sure they have improved  If they continue to occur she might benefit from transfer to [facility] for V[facility]G  2141 Patient was able to swallow pills without difficulty  She is leaning towards back to baseline  TT sent to medicine requesting admission                      Medical Decision Making  45-year-old female presented to the emergency department for evaluation    Vitals and medical record reviewed  On arrival she was in postictal state  She had seizure-like activity in the emergency department treated with Ativan  Spoke with epileptologist who recommended loading dose of lacosamide and increasing daily dose  Patient did not have any repeated seizure-like activity after this  Epileptologist therefore recommended admission overnight for observation  Patient was agreeable to this treatment plan  Seizure-like activity ([facility] [address] ): acute illness or injury  Amount and/or Complexity of Data Reviewed  Independent Historian: friend and EMS     Details: Family and EMS help provide history  External Data Reviewed: notes  Details: Reviewed outpatient neurology notes  Labs: ordered  Discussion of management or test interpretation with external provider(s): Discussed with epileptologist for recommendations and medicine for admission    Risk  Prescription drug management  Disposition  Final diagnoses:   Seizure-like activity ([facility] [address] )     Time reflects when diagnosis was documented in both MDM as applicable and the Disposition within this note     Time User Action Codes Description Comment    5/4/2023  9:52 PM Emogene Anasco [R56 [id number]] Seizure-like activity [facility])       ED Disposition     ED Disposition   Admit    Condition   Stable    Date/Time   Thu May 4, 2023  9:51 PM    Comment   Case was discussed with Kate and the patient's admission status was agreed to be Admission Status: inpatient status to the service of Dr Kennedi Chadwick   Follow-up Information    None         Patient's Medications   Discharge Prescriptions    No medications on file       No discharge procedures on file      PDMP Review     None          ED Provider  Electronically Signed by           Sharon Porter PA-C  05/04/23 7630

## 2023-05-05 VITALS
RESPIRATION RATE: 12 BRPM | WEIGHT: 165.57 LBS | HEART RATE: 57 BPM | TEMPERATURE: 97.5 F | DIASTOLIC BLOOD PRESSURE: 75 MMHG | SYSTOLIC BLOOD PRESSURE: 131 MMHG | HEIGHT: 67 IN | OXYGEN SATURATION: 97 % | BODY MASS INDEX: 25.99 KG/M2

## 2023-05-05 LAB
ANION GAP SERPL CALCULATED.3IONS-SCNC: 5 MMOL/L (ref 4–13)
ATRIAL RATE: 64 BPM
BUN SERPL-MCNC: 13 MG/DL (ref 5–25)
CALCIUM SERPL-MCNC: 8.5 MG/DL (ref 8.4–10.2)
CHLORIDE SERPL-SCNC: 108 MMOL/L (ref 96–108)
CO2 SERPL-SCNC: 25 MMOL/L (ref 21–32)
CREAT SERPL-MCNC: 0.74 MG/DL (ref 0.6–1.3)
ERYTHROCYTE [DISTWIDTH] IN BLOOD BY AUTOMATED COUNT: 13 % (ref 11.6–15.1)
GFR SERPL CREATININE-BSD FRML MDRD: 99 ML/MIN/1.73SQ M
GLUCOSE SERPL-MCNC: 84 MG/DL (ref 65–140)
HCT VFR BLD AUTO: 40.1 % (ref 34.8–46.1)
HGB BLD-MCNC: 12.9 G/DL (ref 11.5–15.4)
MCH RBC QN AUTO: 29.4 PG (ref 26.8–34.3)
MCHC RBC AUTO-ENTMCNC: 32.2 G/DL (ref 31.4–37.4)
MCV RBC AUTO: 91 FL (ref 82–98)
P AXIS: -2 DEGREES
PLATELET # BLD AUTO: 206 THOUSANDS/UL (ref 149–390)
PMV BLD AUTO: 10.4 FL (ref 8.9–12.7)
POTASSIUM SERPL-SCNC: 3.6 MMOL/L (ref 3.5–5.3)
PR INTERVAL: 152 MS
QRS AXIS: 64 DEGREES
QRSD INTERVAL: 80 MS
QT INTERVAL: 410 MS
QTC INTERVAL: 422 MS
RBC # BLD AUTO: 4.39 MILLION/UL (ref 3.81–5.12)
SODIUM SERPL-SCNC: 138 MMOL/L (ref 135–147)
T WAVE AXIS: 30 DEGREES
VENTRICULAR RATE: 64 BPM
WBC # BLD AUTO: 5.78 THOUSAND/UL (ref 4.31–10.16)

## 2023-05-05 RX ORDER — LACOSAMIDE 200 MG/1
200 TABLET ORAL EVERY 12 HOURS SCHEDULED
Qty: 180 TABLET | Refills: 0 | Status: SHIPPED | OUTPATIENT
Start: 2023-05-05 | End: 2023-08-03

## 2023-05-05 RX ADMIN — LACOSAMIDE 200 MG: 100 TABLET, FILM COATED ORAL at 08:09

## 2023-05-05 RX ADMIN — SODIUM CHLORIDE, SODIUM GLUCONATE, SODIUM ACETATE, POTASSIUM CHLORIDE, MAGNESIUM CHLORIDE, SODIUM PHOSPHATE, DIBASIC, AND POTASSIUM PHOSPHATE 100 ML/HR: .53; .5; .37; .037; .03; .012; .00082 INJECTION, SOLUTION INTRAVENOUS at 00:08

## 2023-05-05 NOTE — DISCHARGE SUMMARY
[address]  Discharge- Junious Nip 1980, 37 y o  female MRN: [id number]  Unit/Bed#: -01 Encounter: [id number]  Primary Care Provider: Marielle Marcelino MD   Date and time admitted to hospital: 5/4/2023  6:25 PM    * Breakthrough seizure [facility])  Assessment & Plan  · History of epilepsy dating back to 2020  Follows with [facility], last seen Dec 2022  Vimpat dosage was being titrated up at that time due to ongoing seizure-like events  · Last brain imaging that I can see was in 2020  · At least 2 witnessed tonic-clonic seizures prior to presentation  · Patient now at baseline    · Per epileptologist Dr Tonia Zuleta on-call: Give additional loading dose of Vimpat 300 mg x 1  Increase home dose of Vimpat to 200 mg twice daily  If patient has further seizure/seizure-like events will need transfer to [place] for video EEG monitoring  · CT of the head neg  · We will defer further imaging with MRI to neurology  · Seizure Reporting form faxed to [facility] and explained to patient  · Recommend outpt follow up with neurologist      Discharging Physician / Practitioner: Brenden Khan MD  PCP: Marielle Marcelino MD  Admission Date:   Admission Orders (From admission, onward)     Ordered        05/04/23 2226  Place in Observation  Once                      Discharge Date: 05/05/23    Medical Problems     Resolved Problems  Date Reviewed: 5/5/2023   None         Consultations During Hospital Stay:  · epileptologist    Procedures Performed:   · none    Significant Findings / Test Results:   CT head without contrast    Result Date: 5/4/2023  Impression: No acute intracranial abnormality   Workstation performed: [id number]     Incidental Findings:   · none    Test Results Pending at Discharge (will require follow up):   · none     Outpatient Tests Requested:  · Follow Up with your neurologist in 1 to 2 weeks    Complications:  none    Reason for Admission: seizure    Hospital Course: "    Laurence Moscoso is a 37 y o  female patient who originally presented to the hospital on 5/4/2023 due to seizure  Patient has known seizure disorder for the last 3 years normally takes Vimpat 150 mg twice daily however she at times does forget to take her medicine also complaining of some increased stress recently and going through divorce  She had a seizure at home and was brought to the hospital and had 2-3 seizures that are reported yesterday  Dose of Vimpat was increased to 200 mg twice daily and she was given a loading dose of Vimpat 300 mg x 1  Patient is now doing well back to her baseline stressed compliance with medication patient is worried about affording her medicines as she lost her insurance given her prescription for generic Vimpat along with good Rx card  Please see above list of diagnoses and related plan for additional information  Condition at Discharge: good     Discharge Day Visit / Exam:     Subjective: Patient denies any complaints feels well today  States that she has been going through some increased stress recently but feels well today  Vitals: Blood Pressure: 131/75 (05/05/23 0647)  Pulse: 57 (05/05/23 0647)  Temperature: 97 5 °F (36 4 °C) (05/05/23 0647)  Temp Source: Temporal (05/04/23 1830)  Respirations: 12 (05/05/23 0647)  Height: 5' 7\" (170 2 cm) (05/04/23 1830)  Weight - Scale: 75 1 kg (165 lb 9 1 oz) (05/04/23 1830)  SpO2: 97 % (05/05/23 0740)  Exam:   Physical Exam  Vitals and nursing note reviewed  Constitutional:       Appearance: Normal appearance  HENT:      Head: Normocephalic and atraumatic  Right Ear: External ear normal       Left Ear: External ear normal       Nose: Nose normal       Mouth/Throat:      Pharynx: Oropharynx is clear  Cardiovascular:      Rate and Rhythm: Normal rate and regular rhythm  Heart sounds: Normal heart sounds  Pulmonary:      Effort: Pulmonary effort is normal       Breath sounds: Normal breath sounds     Abdominal: " General: Bowel sounds are normal       Palpations: Abdomen is soft  Tenderness: There is no abdominal tenderness  Musculoskeletal:         General: Normal range of motion  Cervical back: Normal range of motion and neck supple  Skin:     General: Skin is warm and dry  Capillary Refill: Capillary refill takes less than 2 seconds  Neurological:      General: No focal deficit present  Mental Status: She is alert and oriented to person, place, and time  Psychiatric:         Mood and Affect: Mood normal          Discussion with Family: none    Discharge instructions/Information to patient and family:   See after visit summary for information provided to patient and family  Provisions for Follow-Up Care:  See after visit summary for information related to follow-up care and any pertinent home health orders  Disposition:     Home    For Discharges to [facility] SNF:   · Not Applicable to this Patient - Not Applicable to this Patient    Planned Readmission: none     Discharge Statement:  I spent 35 minutes discharging the patient  This time was spent on the day of discharge  I had direct contact with the patient on the day of discharge  Greater than 50% of the total time was spent examining patient, answering all patient questions, arranging and discussing plan of care with patient as well as directly providing post-discharge instructions  Additional time then spent on discharge activities  Discharge Medications:  See after visit summary for reconciled discharge medications provided to patient and family        ** Please Note: This note has been constructed using a voice recognition system **

## 2023-05-05 NOTE — PLAN OF CARE
Problem: PAIN - ADULT  Goal: Verbalizes/displays adequate comfort level or baseline comfort level  Description: Interventions:  - Encourage patient to monitor pain and request assistance  - Assess pain using appropriate pain scale  - Administer analgesics based on type and severity of pain and evaluate response  - Implement non-pharmacological measures as appropriate and evaluate response  - Consider cultural and social influences on pain and pain management  - Notify physician/advanced practitioner if interventions unsuccessful or patient reports new pain  Outcome: Progressing     Problem: INFECTION - ADULT  Goal: Absence or prevention of progression during hospitalization  Description: INTERVENTIONS:  - Assess and monitor for signs and symptoms of infection  - Monitor lab/diagnostic results  - Monitor all insertion sites, i e  indwelling lines, tubes, and drains  - Monitor endotracheal if appropriate and nasal secretions for changes in amount and color  - Trail appropriate cooling/warming therapies per order  - Administer medications as ordered  - Instruct and encourage patient and family to use good hand hygiene technique  - Identify and instruct in appropriate isolation precautions for identified infection/condition  Outcome: Progressing     Problem: SAFETY ADULT  Goal: Patient will remain free of falls  Description: INTERVENTIONS:  - Educate patient/family on patient safety including physical limitations  - Instruct patient to call for assistance with activity   - Consult OT/PT to assist with strengthening/mobility   - Keep Call bell within reach  - Keep bed low and locked with side rails adjusted as appropriate  - Keep care items and personal belongings within reach  - Initiate and maintain comfort rounds  - Make Fall Risk Sign visible to staff    - Apply yellow socks and bracelet for high fall risk patients  - Consider moving patient to room near nurses station  Outcome: Progressing  Goal: Maintain or return to baseline ADL function  Description: INTERVENTIONS:  -  Assess patient's ability to carry out ADLs; assess patient's baseline for ADL function and identify physical deficits which impact ability to perform ADLs (bathing, care of mouth/teeth, toileting, grooming, dressing, etc )  - Assess/evaluate cause of self-care deficits   - Assess range of motion  - Assess patient's mobility; develop plan if impaired  - Assess patient's need for assistive devices and provide as appropriate  - Encourage maximum independence but intervene and supervise when necessary  - Involve family in performance of ADLs  - Assess for home care needs following discharge   - Consider OT consult to assist with ADL evaluation and planning for discharge  - Provide patient education as appropriate  Outcome: Progressing  Goal: Maintains/Returns to pre admission functional level  Description: INTERVENTIONS:  - Perform BMAT or MOVE assessment daily    - Set and communicate daily mobility goal to care team and patient/family/caregiver     - Collaborate with rehabilitation services on mobility goals if consulted    - Out of bed for toileting  - Record patient progress and toleration of activity level   Outcome: Progressing     Problem: DISCHARGE PLANNING  Goal: Discharge to home or other facility with appropriate resources  Description: INTERVENTIONS:  - Identify barriers to discharge w/patient and caregiver  - Arrange for needed discharge resources and transportation as appropriate  - Identify discharge learning needs (meds, wound care, etc )  - Arrange for interpretive services to assist at discharge as needed  - Refer to Case Management Department for coordinating discharge planning if the patient needs post-hospital services based on physician/advanced practitioner order or complex needs related to functional status, cognitive ability, or social support system  Outcome: Progressing     Problem: Knowledge Deficit  Goal: Patient/family/caregiver demonstrates understanding of disease process, treatment plan, medications, and discharge instructions  Description: Complete learning assessment and assess knowledge base    Interventions:  - Provide teaching at level of understanding  - Provide teaching via preferred learning methods  Outcome: Progressing

## 2023-05-05 NOTE — UTILIZATION REVIEW
Initial Clinical Review    Admission: Date/Time/Statement:   Admission Orders (From admission, onward)     Ordered        05/04/23 2226  Place in Observation  Once                      Orders Placed This Encounter   Procedures    Place in Observation     Standing Status:   Standing     Number of Occurrences:   1     Order Specific Question:   Level of Care     Answer:   Level 2 Stepdown / HOT [14]     ED Arrival Information     Expected   -    Arrival   5/4/2023 18:25    Acuity   Urgent            Means of arrival   Ambulance    Escorted by   [employer] [id number]   Hospitalist    Admission type   Emergency            Arrival complaint   weakness           Chief Complaint   Patient presents with    Seizure - Prior Hx Of     Patient brought in by EMS for seizures  Initial Presentation: 37 y o  female to the ED from home via EMS with complaints of seizure  Admitted under observation for breakthrough seizure  H/o epilepsy  Had witnessed seizure at home  On arrival to the ED, had another seizure and terminated with Ativan  Increase dose of vimpat  Neurology consult       Date:     Day 2:      ED Triage Vitals   Temperature Pulse Respirations Blood Pressure SpO2   05/04/23 1830 05/04/23 1830 05/04/23 1830 05/04/23 1830 05/04/23 1830   98 5 °F (36 9 °C) 65 18 163/86 99 %      Temp Source Heart Rate Source Patient Position - Orthostatic VS BP Location FiO2 (%)   05/04/23 1830 05/04/23 1830 05/04/23 1830 05/04/23 1830 --   Temporal Monitor Lying Left arm       Pain Score       05/04/23 2349       6          Wt Readings from Last 1 Encounters:   05/04/23 75 1 kg (165 lb 9 1 oz)     Additional Vital Signs:   Time Temp Pulse Resp BP MAP (mmHg) SpO2 O2 Device Patient Position - Orthostatic VS   05/05/23 06:47:29 97 5 °F (36 4 °C) 57 12 131/75 94 98 % -- --   05/05/23 0247 97 9 °F (36 6 °C) 56 16 127/72 -- 97 % None (Room air) --   05/04/23 2358 97 7 °F (36 5 °C) 60 17 145/91 -- 99 % None (Room air) -- 05/04/23 2200 -- 54 Abnormal  18 122/78 95 98 % None (Room air) Lying   05/04/23 2130 -- 60 18 138/73 101 97 % None (Room air) Lying   05/04/23 2000 -- 59 18 134/76 100 99 % None (Room air) Lying   05/04/23 1930 -- 78 18 180/81 Abnormal  119 100 % None (Room air) Lying   05/04/23 1835 -- -- -- -- -- -- None (Room air) --   05/04/23 1830 98 5 °F (36 9 °C) 65 18 163/86 -- 99 % None (Room air) Lying       Pertinent Labs/Diagnostic Test Results:   5/4 EKG: Normal sinus rhythm  Normal ECG  When compared with ECG of 05-APR-2022 10:59,  Sinus rhythm has replaced Atrial flutter  T wave amplitude has increased in Anterior leads  CT head without contrast   Final Result by Liz Conrad MD (05/04 2346)      No acute intracranial abnormality                    Workstation performed: [id number]               Results from last 7 days   Lab Units 05/05/23 0455 05/04/23  1848   WBC Thousand/uL 5 78 7 98   HEMOGLOBIN g/dL 12 9 14 1   HEMATOCRIT % 40 1 44 5   PLATELETS Thousands/uL 206 240   NEUTROS ABS Thousands/µL  --  3 52         Results from last 7 days   Lab Units 05/05/23  0455 05/04/23  1848   SODIUM mmol/L 138 138   POTASSIUM mmol/L 3 6 3 7   CHLORIDE mmol/L 108 106   CO2 mmol/L 25 25   ANION GAP mmol/L 5 7   BUN mg/dL 13 14   CREATININE mg/dL 0 74 0 92   EGFR ml/min/1 73sq m 99 76   CALCIUM mg/dL 8 5 9 1     Results from last 7 days   Lab Units 05/04/23  1848   AST U/L 16   ALT U/L 14   ALK PHOS U/L 50   TOTAL PROTEIN g/dL 6 6   ALBUMIN g/dL 4 2   TOTAL BILIRUBIN mg/dL 0 43     Results from last 7 days   Lab Units 05/04/23  1852   POC GLUCOSE mg/dl 93     Results from last 7 days   Lab Units 05/05/23  0455 05/04/23  1848   GLUCOSE RANDOM mg/dL 84 100       Results from last 7 days   Lab Units 05/04/23  1848   CK TOTAL U/L 82         Results from last 7 days   Lab Units 05/04/23  1848   PROTIME seconds 13 2   INR  0 99   PTT seconds 32           Results from last 7 days   Lab Units 05/04/23  2304   CLARITY UA  Clear COLOR UA  Yellow   SPEC GRAV UA  1 020   PH UA  7 0   GLUCOSE UA mg/dl Negative   KETONES UA mg/dl Negative   BLOOD UA  Negative   PROTEIN UA mg/dl Negative   NITRITE UA  Negative   BILIRUBIN UA  Negative   UROBILINOGEN UA E U /dl 0 2   LEUKOCYTES UA  Negative     ED Treatment:   Medication Administration from 05/04/2023 1824 to 05/04/2023 2349       Date/Time Order Dose Route Action     05/04/2023 1848 EDT sodium chloride 0 9 % bolus 1,000 mL 1,000 mL Intravenous New Bag     05/04/2023 1930 EDT LORazepam (ATIVAN) injection 2 mg 2 mg Intravenous Given     05/04/2023 2045 EDT lacosamide (VIMPAT) tablet 300 mg 300 mg Oral Given        Past Medical History:   Diagnosis Date    [facility] )     Seizure ([facility] )      Present on Admission:   Breakthrough seizure ([facility] )      Admitting Diagnosis: Seizure ([facility] ) [R56 9]  Seizure-like activity ([facility] ) [R56 9]  Breakthrough seizure ([facility] ) [G40 919]  Age/Sex: 37 y o  female  Admission Orders:  UP and OOB  SCDS  NPO    Scheduled Medications:  lacosamide, 200 mg, Oral, Q12H [facility] & intermediate      Continuous IV Infusions:  multi-electrolyte, 100 mL/hr, Intravenous, Continuous      PRN Meds:  acetaminophen, 650 mg, Oral, Q6H PRN  ondansetron, 4 mg, Intravenous, Q6H PRN        IP CONSULT TO NEUROLOGY    Network Utilization Review Department  ATTENTION: Please call with any questions or concerns to [phone] and carefully listen to the prompts so that you are directed to the right person  All voicemails are confidential   Beauford Reges all requests for admission clinical reviews, approved or denied determinations and any other requests to dedicated fax number below belonging to the campus where the patient is receiving treatment   List of dedicated fax numbers for the Facilities:  [address] DENIALS (Administrative/Medical Necessity) [phone]   [address] [place] [address] (Maternity/NICU/Pediatrics) [address] [place] [address],[address] [phone]   [facility] [address]  [phone]   Zeynep Nesbitt 50 [address] [facility] [place] 15 [facility] [address] Sofie Mercedes [address]81 [address] [phone]

## 2023-05-05 NOTE — H&P
[address]  H&P  Name: Brodie Gandhi 37 y o  female I MRN: [id number]  Unit/Bed#: ED 05 I Date of Admission: 5/4/2023   Date of Service: 5/4/2023 I Hospital Day: 0      Assessment/Plan   Breakthrough seizure [facility])  Assessment & Plan  · History of epilepsy dating back to 2020  Follows with [facility], last seen Dec 2022  Vimpat dosage was being titrated up at that time due to ongoing seizure-like events  · Last brain imaging that I can see was in 2020  · At least 2 witnessed tonic-clonic seizures prior to presentation  · Patient now at baseline    · Per epileptologist Dr Bill Cali on-call: Give additional loading dose of Vimpat 300 mg x 1  Increase home dose of Vimpat to 200 mg twice daily  If patient has further seizure/seizure-like events will need transfer to [place] for video EEG monitoring  · CT of the head pending  · We will defer further imaging with MRI to neurology  · Formal Neurology consult  · We will check CK      VTE Pharmacologic Prophylaxis: VTE Score: 2 Low Risk (Score 0-2) - Encourage Ambulation  Code Status: Level 1 - Full Code   Discussion with family: Patient declined call to   Anticipated Length of Stay: Patient will be admitted on an observation basis with an anticipated length of stay of less than 2 midnights secondary to breakthrough seizure  Total Time Spent on Date of Encounter in care of patient: 55 minutes This time was spent on one or more of the following: performing physical exam; counseling and coordination of care; obtaining or reviewing history; documenting in the medical record; reviewing/ordering tests, medications or procedures; communicating with other healthcare professionals and discussing with patient's family/caregivers  Chief Complaint: seizure    History of Present Illness:  Brodie Gandhi is a 37 y o  female with a PMH of epilepsy who presented to the [address] [place] [address] 18 with seizure activity  Patient was in usual state of health until this evening  She had a witnessed seizure at home, witnessed by her best friend  She had reported foaming at the mouth, clenched extremities, clonic movements  She came to and was brought to the emergency room  She again had another episode in the emergency room terminated with Ativan  At the time of my evaluation patient was back to baseline mental status  Was loaded with lacosamide in the emergency room and will be monitored overnight with an increased dose starting tomorrow a m  Otherwise patient had no acute complaints  Was drowsy from the Ativan but otherwise feeling well  No other neurologic complaints such as numbness or tingling, weakness, vision changes, difficulty swallowing, slurred speech, or facial droop  REVIEW OF SYSTEMS  General Denies fevers or chills  Denies generalized weakness or fatigue  HEENT Denies hearing or vision changes  Cardiovascular Denies chest pain  Denies LE swelling  Denies palpitations  Denies dyspnea on exertion  Respiratory Denies cough  Denies difficulty breathing  Denies shortness of breath  Genitourinary Denies hematuria  Denies dysuria  Denies difficulty voiding  Denies incontinence  Gastrointestinal Denies nausea, vomiting, or diarrhea  Denies hematochezia, melena, or hematemesis  Musculoskeletal Denies arthralgias or myalgias  Denies joint swelling  Psychiatric  Denies changes in mood  Denies anxiety or depression  Neurologic Denies headache  Denies lightheadedness/dizziness  Denies numbness/tingling  Denies weakness  Positive for seizures  Endocrine Denies weight loss or weight gain  Denies excessive thirst, sweating, urination  Past Medical and Surgical History:   Past Medical History:   Diagnosis Date    Epilepsy ([facility] )     Seizure ([facility] )        History reviewed  No pertinent surgical history  Meds/Allergies:  Prior to Admission medications    Medication Sig Start Date End Date Taking? "Authorizing Provider   lacosamide (VIMPAT) 50 mg Take 100 mg by mouth 2 (two) times a day 11/4/21 11/4/22  Historical Provider, MD   multivitamin (THERAGRAN) TABS Take 1 tablet by mouth daily    Historical Provider, MD     I have reviewed home medications using recent Epic encounter  Allergies: Allergies   Allergen Reactions    Propofol Other (See Comments)     Grand Mal seizure in recovery following MAC anesthetic where lidocaine, fentanyl, midazolam, and ondansetron were given IV  Unknown trigger   Morphine Fever, Hives, Itching, Other (See Comments), Rash and Vomiting     Other reaction(s): RASH, HIVES      Medical Tape Rash     Skin blisters  Skin blisters         Social History:  Marital Status: /Civil Union   Occupation:   Patient Pre-hospital Living Situation: Home  Patient Pre-hospital Level of Mobility: walks  Patient Pre-hospital Diet Restrictions: none  Substance Use History:   Social History     Substance and Sexual Activity   Alcohol Use Not Currently     Social History     Tobacco Use   Smoking Status Never   Smokeless Tobacco Never     Social History     Substance and Sexual Activity   Drug Use Not Currently       Family History:  History reviewed  No pertinent family history  Physical Exam:     Vitals:   Blood Pressure: 122/78 (05/04/23 2200)  Pulse: (!) 54 (05/04/23 2200)  Temperature: 98 5 °F (36 9 °C) (05/04/23 1830)  Temp Source: Temporal (05/04/23 1830)  Respirations: 18 (05/04/23 2200)  Height: 5' 7\" (170 2 cm) (05/04/23 1830)  Weight - Scale: 75 1 kg (165 lb 9 1 oz) (05/04/23 1830)  SpO2: 98 % (05/04/23 2200)    PHYSICAL EXAM:    Vitals signs reviewed  Constitutional   Awake and cooperative  NAD  Head/Neck   Normocephalic  Atraumatic  HEENT   No scleral icterus  EOMI  Heart   Regular rate and rhythm  No murmers  Lungs   Clear to auscultation bilaterally  Respirations unlaboured  Abdomen   Soft  Nontender  Nondistended  Skin   Skin color normal  No rashes   " Extremities   No deformities  No peripheral edema  Neuro   Alert and oriented  No new deficits  Psych   Mood stable  Affect normal          Additional Data:     Lab Results:  Results from last 7 days   Lab Units 05/04/23  1848   WBC Thousand/uL 7 98   HEMOGLOBIN g/dL 14 1   HEMATOCRIT % 44 5   PLATELETS Thousands/uL 240   NEUTROS PCT % 44   LYMPHS PCT % 46*   MONOS PCT % 8   EOS PCT % 1     Results from last 7 days   Lab Units 05/04/23  1848   SODIUM mmol/L 138   POTASSIUM mmol/L 3 7   CHLORIDE mmol/L 106   CO2 mmol/L 25   BUN mg/dL 14   CREATININE mg/dL 0 92   ANION GAP mmol/L 7   CALCIUM mg/dL 9 1   ALBUMIN g/dL 4 2   TOTAL BILIRUBIN mg/dL 0 43   ALK PHOS U/L 50   ALT U/L 14   AST U/L 16   GLUCOSE RANDOM mg/dL 100     Results from last 7 days   Lab Units 05/04/23  1848   INR  0 99     Results from last 7 days   Lab Units 05/04/23  1852   POC GLUCOSE mg/dl 93               Lines/Drains:  Invasive Devices     Peripheral Intravenous Line  Duration           Peripheral IV 05/04/23 Left;Proximal;Ventral (anterior) Forearm <1 day                    Imaging: No pertinent imaging reviewed  CT head without contrast    (Results Pending)       ** Please Note: This note has been constructed using a voice recognition system   **

## 2023-05-05 NOTE — PLAN OF CARE
Problem: PAIN - ADULT  Goal: Verbalizes/displays adequate comfort level or baseline comfort level  Description: Interventions:  - Encourage patient to monitor pain and request assistance  - Assess pain using appropriate pain scale  - Administer analgesics based on type and severity of pain and evaluate response  - Implement non-pharmacological measures as appropriate and evaluate response  - Consider cultural and social influences on pain and pain management  - Notify physician/advanced practitioner if interventions unsuccessful or patient reports new pain  5/5/2023 0954 by Giacomo Dietz RN  Outcome: Adequate for Discharge  5/5/2023 0751 by Giacomo Dietz RN  Outcome: Progressing     Problem: INFECTION - ADULT  Goal: Absence or prevention of progression during hospitalization  Description: INTERVENTIONS:  - Assess and monitor for signs and symptoms of infection  - Monitor lab/diagnostic results  - Monitor all insertion sites, i e  indwelling lines, tubes, and drains  - Monitor endotracheal if appropriate and nasal secretions for changes in amount and color  - [place] appropriate cooling/warming therapies per order  - Administer medications as ordered  - Instruct and encourage patient and family to use good hand hygiene technique  - Identify and instruct in appropriate isolation precautions for identified infection/condition  5/5/2023 0954 by Giacomo Dietz RN  Outcome: Adequate for Discharge  5/5/2023 0751 by Giacomo Dietz RN  Outcome: Progressing     Problem: SAFETY ADULT  Goal: Patient will remain free of falls  Description: INTERVENTIONS:  - Educate patient/family on patient safety including physical limitations  - Instruct patient to call for assistance with activity   - Consult OT/PT to assist with strengthening/mobility   - Keep Call bell within reach  - Keep bed low and locked with side rails adjusted as appropriate  - Keep care items and personal belongings within reach  - Initiate and maintain comfort rounds  - Make Fall Risk Sign visible to staff    - Apply yellow socks and bracelet for high fall risk patients  - Consider moving patient to room near nurses station  5/5/2023 0954 by Chantell King RN  Outcome: Adequate for Discharge  5/5/2023 0751 by Chantell King RN  Outcome: Progressing  Goal: Maintain or return to baseline ADL function  Description: INTERVENTIONS:  -  Assess patient's ability to carry out ADLs; assess patient's baseline for ADL function and identify physical deficits which impact ability to perform ADLs (bathing, care of mouth/teeth, toileting, grooming, dressing, etc )  - Assess/evaluate cause of self-care deficits   - Assess range of motion  - Assess patient's mobility; develop plan if impaired  - Assess patient's need for assistive devices and provide as appropriate  - Encourage maximum independence but intervene and supervise when necessary  - Involve family in performance of ADLs  - Assess for home care needs following discharge   - Consider OT consult to assist with ADL evaluation and planning for discharge  - Provide patient education as appropriate  5/5/2023 0954 by Chantell King RN  Outcome: Adequate for Discharge  5/5/2023 0751 by Chantell King RN  Outcome: Progressing  Goal: Maintains/Returns to pre admission functional level  Description: INTERVENTIONS:  - Perform BMAT or MOVE assessment daily    - Set and communicate daily mobility goal to care team and patient/family/caregiver     - Collaborate with rehabilitation services on mobility goals if consulted    - Out of bed for toileting  - Record patient progress and toleration of activity level   5/5/2023 0954 by Chantell King RN  Outcome: Adequate for Discharge  5/5/2023 0751 by Chantell King RN  Outcome: Progressing     Problem: DISCHARGE PLANNING  Goal: Discharge to home or other facility with appropriate resources  Description: INTERVENTIONS:  - Identify barriers to discharge w/patient and caregiver  - Arrange for needed discharge resources and transportation as appropriate  - Identify discharge learning needs (meds, wound care, etc )  - Arrange for interpretive services to assist at discharge as needed  - Refer to Case Management Department for coordinating discharge planning if the patient needs post-hospital services based on physician/advanced practitioner order or complex needs related to functional status, cognitive ability, or social support system  5/5/2023 0954 by Bina Garcia RN  Outcome: Adequate for Discharge  5/5/2023 0751 by Bina Garcia RN  Outcome: Progressing     Problem: Knowledge Deficit  Goal: Patient/family/caregiver demonstrates understanding of disease process, treatment plan, medications, and discharge instructions  Description: Complete learning assessment and assess knowledge base    Interventions:  - Provide teaching at level of understanding  - Provide teaching via preferred learning methods  5/5/2023 0954 by Bina Garcia RN  Outcome: Adequate for Discharge  5/5/2023 0751 by Bina Garcia RN  Outcome: Progressing     Problem: MOBILITY - ADULT  Goal: Maintain or return to baseline ADL function  Description: INTERVENTIONS:  -  Assess patient's ability to carry out ADLs; assess patient's baseline for ADL function and identify physical deficits which impact ability to perform ADLs (bathing, care of mouth/teeth, toileting, grooming, dressing, etc )  - Assess/evaluate cause of self-care deficits   - Assess range of motion  - Assess patient's mobility; develop plan if impaired  - Assess patient's need for assistive devices and provide as appropriate  - Encourage maximum independence but intervene and supervise when necessary  - Involve family in performance of ADLs  - Assess for home care needs following discharge   - Consider OT consult to assist with ADL evaluation and planning for discharge  - Provide patient education as appropriate  5/5/2023 0954 by Bina Garcia RN  Outcome: Adequate for Discharge  5/5/2023 0751 by Jorge Alberto Franklin RN  Outcome: Progressing  Goal: Maintains/Returns to pre admission functional level  Description: INTERVENTIONS:  - Perform BMAT or MOVE assessment daily    - Set and communicate daily mobility goal to care team and patient/family/caregiver     - Collaborate with rehabilitation services on mobility goals if consulted    - Out of bed for toileting  - Record patient progress and toleration of activity level   5/5/2023 0954 by Jorge Alberto Franklin RN  Outcome: Adequate for Discharge  5/5/2023 0751 by Jorge Alberto Franklin RN  Outcome: Progressing

## 2023-05-05 NOTE — ASSESSMENT & PLAN NOTE
· History of epilepsy dating back to 2020  Follows with [facility], last seen Dec 2022  Vimpat dosage was being titrated up at that time due to ongoing seizure-like events  · Last brain imaging that I can see was in 2020  · At least 2 witnessed tonic-clonic seizures prior to presentation  · Patient now at baseline    · Per epileptologist Dr Claudine Choe on-call: Give additional loading dose of Vimpat 300 mg x 1  Increase home dose of Vimpat to 200 mg twice daily  If patient has further seizure/seizure-like events will need transfer to [place] for video EEG monitoring  · CT of the head neg  · We will defer further imaging with MRI to neurology  · Seizure Reporting form faxed to [email or web address] and explained to patient    · Recommend outpt follow up with neurologist

## 2023-05-05 NOTE — ED PROVIDER NOTES
"History  Chief Complaint   Patient presents with    Seizure - Prior Hx Of     Patient brought in by EMS for seizures  Ritchie Tariq, 38 yo female presented to the ED for evaluation status post seizure  Transported via EMS  Postictal state on arrival   She was following with reading neuro after first seziure in 2020, due to this neurologist passing away patient was subsequently seen at [facility] in dec for continued seizures  Per notes \"She was initially tried on levetiracetam but had side effects  She was transitioned to lacosamide  \"  Due to continued breakthrough seizures, [facility] neurologist increased close of my dose to 150 mg twice daily  Patient states she has been taking this medication as prescribed  She did not remember seizure today  Friend states she was at the patient's home when patient called her upstairs  States she went upstairs to the patient's bedroom and patient had a \"small seizure\" she had a postictal  state they took her down stairs and about 20 minutes later had a \"large seizure\" foaming at the mouth, hands contracted, shaking  There was no loss of bowel or bladder control  There was no tongue injury  Family denies any head strike  Patient denies any recent URI symptoms  No fevers  No complaints on arrival   Patient states she is typically able to feel seizures coming on however cannot recall the seizure coming on  History provided by:  Patient, EMS personnel and friend      Prior to Admission Medications   Prescriptions Last Dose Informant Patient Reported? Taking?   lacosamide (VIMPAT) 50 mg   Yes No   Sig: Take 100 mg by mouth 2 (two) times a day   multivitamin (THERAGRAN) TABS   Yes No   Sig: Take 1 tablet by mouth daily      Facility-Administered Medications: None       Past Medical History:   Diagnosis Date    [facility] ([facility] [address] )     [facility]        History reviewed  No pertinent surgical history  History reviewed  No pertinent family history    I have reviewed " and agree with the history as documented  E-Cigarette/Vaping    E-Cigarette Use Never User      E-Cigarette/Vaping Substances     Social History     Tobacco Use    Smoking status: Never    Smokeless tobacco: Never   Vaping Use    Vaping Use: Never used   Substance Use Topics    Alcohol use: Not Currently    Drug use: Not Currently       Review of Systems   Neurological: Positive for seizures  Negative for dizziness, tremors, syncope, facial asymmetry, speech difficulty, weakness, light-headedness, numbness and headaches  All other systems reviewed and are negative  Physical Exam  Physical Exam  Vitals and nursing note reviewed  Constitutional:       General: She is not in acute distress  Appearance: Normal appearance  She is not ill-appearing, toxic-appearing or diaphoretic  HENT:      Head: Normocephalic and atraumatic  Nose: Nose normal       Mouth/Throat:      Mouth: Mucous membranes are moist       Pharynx: Oropharynx is clear  No oropharyngeal exudate or posterior oropharyngeal erythema  Comments: No tongue injury  Eyes:      Extraocular Movements: Extraocular movements intact  Conjunctiva/sclera: Conjunctivae normal       Pupils: Pupils are equal, round, and reactive to light  Comments: No nystagmus   Cardiovascular:      Rate and Rhythm: Normal rate and regular rhythm  Pulmonary:      Effort: Pulmonary effort is normal       Breath sounds: Normal breath sounds  No stridor  No wheezing, rhonchi or rales  Chest:      Chest wall: No tenderness  Abdominal:      General: Abdomen is flat  Bowel sounds are normal  There is no distension  Palpations: Abdomen is soft  Tenderness: There is no abdominal tenderness  There is no guarding  Musculoskeletal:         General: Normal range of motion  Cervical back: Normal range of motion  No tenderness  Skin:     General: Skin is warm and dry  Neurological:      General: No focal deficit present  Mental Status: She is alert and oriented to person, place, and time  She is confused  GCS: GCS eye subscore is 4  GCS verbal subscore is 5  GCS motor subscore is 6  Cranial Nerves: No facial asymmetry  Sensory: Sensation is intact  Motor: Motor function is intact  No weakness  Comments: Appears tired and postictal  She is confused of the events today         Psychiatric:         Mood and Affect: Mood normal          Vital Signs  ED Triage Vitals [05/04/23 1830]   Temperature Pulse Respirations Blood Pressure SpO2   98 5 °F (36 9 °C) 65 18 163/86 99 %      Temp Source Heart Rate Source Patient Position - Orthostatic VS BP Location FiO2 (%)   Temporal Monitor Lying Left arm --      Pain Score       --           Vitals:    05/04/23 1830 05/04/23 1930 05/04/23 2000   BP: 163/86 (!) 180/81 134/76   Pulse: 65 78 59   Patient Position - Orthostatic VS: Lying Lying Lying         Visual Acuity  Visual Acuity    Flowsheet Row Most Recent Value   L Pupil Size (mm) 3   R Pupil Size (mm) 3          ED Medications  Medications   sodium chloride 0 9 % bolus 1,000 mL (1,000 mL Intravenous New Bag 5/4/23 1848)   LORazepam (ATIVAN) injection 2 mg (2 mg Intravenous Given 5/4/23 1930)   lacosamide (VIMPAT) tablet 300 mg (300 mg Oral Given 5/4/23 2045)       Diagnostic Studies  Results Reviewed     Procedure Component Value Units Date/Time    Protime-INR [[id number]]  (Normal) Collected: 05/04/23 1848    Lab Status: Final result Specimen: Blood from Arm, Left Updated: 05/04/23 1933     Protime 13 2 seconds      INR 0 99    APTT [[id number]]  (Normal) Collected: 05/04/23 1848    Lab Status: Final result Specimen: Blood from Arm, Left Updated: 05/04/23 1933     PTT 32 seconds     Comprehensive metabolic panel [[id number]] Collected: 05/04/23 1848    Lab Status: Final result Specimen: Blood from Arm, Left Updated: 05/04/23 1918     Sodium 138 mmol/L      Potassium 3 7 mmol/L      Chloride 106 mmol/L      CO2 25 mmol/L ANION GAP 7 mmol/L      BUN 14 mg/dL      Creatinine 0 92 mg/dL      Glucose 100 mg/dL      Calcium 9 1 mg/dL      AST 16 U/L      ALT 14 U/L      Alkaline Phosphatase 50 U/L      Total Protein 6 6 g/dL      Albumin 4 2 g/dL      Total Bilirubin 0 43 mg/dL      eGFR 76 ml/min/1 73sq m     Narrative:      [place] guidelines for Chronic Kidney Disease (CKD):     Stage 1 with normal or high GFR (GFR > 90 mL/min/1 73 square meters)    Stage 2 Mild CKD (GFR = 60-89 mL/min/1 73 square meters)    Stage 3A Moderate CKD (GFR = 45-59 mL/min/1 73 square meters)    Stage 3B Moderate CKD (GFR = 30-44 mL/min/1 73 square meters)    Stage 4 Severe CKD (GFR = 15-29 mL/min/1 73 square meters)    Stage 5 End Stage CKD (GFR <15 mL/min/1 73 square meters)  Note: GFR calculation is accurate only with a steady state creatinine    Fingerstick Glucose (POCT) [[id number]]  (Normal) Collected: 05/04/23 1852    Lab Status: Final result Updated: 05/04/23 1853     POC Glucose 93 mg/dl     CBC and differential [[id number]]  (Abnormal) Collected: 05/04/23 1848    Lab Status: Final result Specimen: Blood from Arm, Left Updated: 05/04/23 1853     WBC 7 98 Thousand/uL      RBC 4 81 Million/uL      Hemoglobin 14 1 g/dL      Hematocrit 44 5 %      MCV 93 fL      MCH 29 3 pg      MCHC 31 7 g/dL      RDW 13 2 %      MPV 10 6 fL      Platelets 624 Thousands/uL      nRBC 0 /100 WBCs      Neutrophils Relative 44 %      Immat GRANS % 0 %      Lymphocytes Relative 46 %      Monocytes Relative 8 %      Eosinophils Relative 1 %      Basophils Relative 1 %      Neutrophils Absolute 3 52 Thousands/µL      Immature Grans Absolute 0 03 Thousand/uL      Lymphocytes Absolute 3 62 Thousands/µL      Monocytes Absolute 0 65 Thousand/µL      Eosinophils Absolute 0 10 Thousand/µL      Basophils Absolute 0 06 Thousands/µL     Lacosamide [[id number]] Collected: 05/04/23 1848    Lab Status:  In process Specimen: Blood from Arm, Left Updated: 05/04/23 1850    UA (URINE) with reflex to Scope [[id number]]     Lab Status: No result Specimen: Urine     POCT pregnancy, urine [[id number]]     Lab Status: No result                  No orders to display              Procedures  Procedures         ED Course  ED Course as of 05/04/23 2138   Thu May 04, 2023   1934 Patient was actively seizing  Resolved with 2 of Ativan  200 [place] text sent to epileptologist   2017 Anca Espinalo - epileptologist recommended  give lacosamide 300 mg now and increase maintenance to 200 mg bid  Would consider loading with valproate if there are additional events  It doesn't seem that the diagnosis has been confirmed, so nonepileptic events remain in the differential   She probably should be watched overnight at this point to make sure they have improved  If they continue to occur she might benefit from transfer to [facility] for VEEG  MDM    Disposition  Final diagnoses:   None     ED Disposition     None      Follow-up Information    None         Patient's Medications   Discharge Prescriptions    No medications on file       No discharge procedures on file      PDMP Review     None          ED Provider  Electronically Signed by

## 2023-05-05 NOTE — CASE MANAGEMENT
Case Management Discharge Planning Note    Patient name Tyrell Partida  Location /-96 MRN [id number]  : 1980 Date 2023       Current Admission Date: 2023  Current Admission Diagnosis:Breakthrough seizure [facility])   Patient Active Problem List    Diagnosis Date Noted    Breakthrough seizure ([address] ) 2023      LOS (days): 0  Geometric Mean LOS (GMLOS) (days):   Days to GMLOS:     OBJECTIVE:            Current admission status: Observation   Preferred Pharmacy:   [facility] [address] [facility] [address] [place] [address] [address]  Phone: [phone] Fax: [phone]    Primary Care Provider: Mahlon Cranker, MD    Primary Insurance:   Secondary Insurance:     DISCHARGE DETAILS:    Patient is being dc today  Pt applied for MA last week per provider and does not have health insurance  She will need to stay on Vimpat, provider requested assistance for medications for dc  Researched Vimpat is available and generic with [facility] and cost is about $40   Reviewed this with patient, provider her the [facility] Card and education on [facility] Card  She was thankful for the information and will be using [facility] and [facility] for her Medications  CM also provided resources on rural health clinics  until she gets Medical assistance

## 2023-05-05 NOTE — ASSESSMENT & PLAN NOTE
· History of epilepsy dating back to 2020  Follows with [facility], last seen Dec 2022  Vimpat dosage was being titrated up at that time due to ongoing seizure-like events  · Last brain imaging that I can see was in 2020  · At least 2 witnessed tonic-clonic seizures prior to presentation  · Patient now at baseline    · Per epileptologist Dr Anca Cantu on-call: Give additional loading dose of Vimpat 300 mg x 1  Increase home dose of Vimpat to 200 mg twice daily  If patient has further seizure/seizure-like events will need transfer to [place] for video EEG monitoring    · CT of the head pending  · We will defer further imaging with MRI to neurology  · Formal Neurology consult  · We will check CK

## 2023-05-09 LAB — LACOSAMIDE SERPL-MCNC: 5 UG/ML (ref 5–10)

## 2023-10-28 ENCOUNTER — HOSPITAL ENCOUNTER (EMERGENCY)
Facility: HOSPITAL | Age: 43
Discharge: HOME | End: 2023-10-28
Attending: EMERGENCY MEDICINE

## 2023-10-28 VITALS
HEIGHT: 67 IN | BODY MASS INDEX: 25.54 KG/M2 | OXYGEN SATURATION: 99 % | HEART RATE: 68 BPM | RESPIRATION RATE: 20 BRPM | TEMPERATURE: 97.2 F | DIASTOLIC BLOOD PRESSURE: 111 MMHG | WEIGHT: 162.7 LBS | SYSTOLIC BLOOD PRESSURE: 191 MMHG

## 2023-10-28 DIAGNOSIS — K08.89 TOOTH PAIN: Primary | ICD-10-CM

## 2023-10-28 PROCEDURE — 99284 EMERGENCY DEPT VISIT MOD MDM: CPT | Performed by: EMERGENCY MEDICINE

## 2023-10-28 PROCEDURE — 99283 EMERGENCY DEPT VISIT LOW MDM: CPT

## 2023-10-28 RX ORDER — PENICILLIN V POTASSIUM 500 MG/1
500 TABLET ORAL 4 TIMES DAILY
Qty: 28 TABLET | Refills: 0 | Status: SHIPPED | OUTPATIENT
Start: 2023-10-28 | End: 2023-11-04

## 2023-10-28 RX ORDER — PENICILLIN V POTASSIUM 250 MG/1
500 TABLET ORAL ONCE
Status: COMPLETED | OUTPATIENT
Start: 2023-10-28 | End: 2023-10-28

## 2023-10-28 RX ORDER — IBUPROFEN 600 MG/1
600 TABLET ORAL ONCE
Status: COMPLETED | OUTPATIENT
Start: 2023-10-28 | End: 2023-10-28

## 2023-10-28 RX ORDER — PENICILLIN V POTASSIUM 500 MG/1
500 TABLET ORAL 4 TIMES DAILY
Qty: 28 TABLET | Refills: 0 | Status: SHIPPED | OUTPATIENT
Start: 2023-10-28 | End: 2023-10-28 | Stop reason: SDUPTHER

## 2023-10-28 RX ADMIN — IBUPROFEN 600 MG: 600 TABLET ORAL at 09:05

## 2023-10-28 RX ADMIN — PENICILLIN V POTASSIUM 500 MG: 250 TABLET, FILM COATED ORAL at 09:05

## 2023-10-28 NOTE — Clinical Note
Fabián Barriga was seen and treated in our emergency department on 10/28/2023. Diagnosis:     Felicia David  may return to work on return date. She may return on this date: 10/29/2023         If you have any questions or concerns, please don't hesitate to call.       Jason Blair MD    ______________________________           _______________          _______________  Hospital Representative                              Date                                Time

## 2023-10-28 NOTE — DISCHARGE INSTRUCTIONS
Here is a list of  dental clinics that may be able to help you. Keep in mind that these clinics do not have to see you or any other patient. Also, these clinics are not connected to the [facility] or [address] [place] [address] but if they agree to see you as a patient it is easy for them to call  Medical Records Department to have your records faxed to them. [facility]:  [address] [facility]   [address] [facility]   22-85-39-05:   [facility]   [address] [facility], [address]   [phone]     [facility]:  [address]  [place] [address] [address]  [phone]    [address]:  [address] [facility], [address] [facility] [address]  [phone] [address]:  [address] [place] [address] [address] [facility]  ([address]:  [address] [facility], [address]  [phone]    [facility]:  [address] [facility], [address]  [phone]    [address]:  [address] [facility] [address] [facility]  [phone] [address]:  [address]  [phone] [address]  [address]  [address] [facility], [facility] [facility] [address]  [phone]    [place] [address]:  [address] [facility] [address], [address] [place] [address]  Associates:  90 [facility] [place] [facility], [address]  [phone]

## 2023-10-28 NOTE — ED PROVIDER NOTES
History  Chief Complaint   Patient presents with    Dental Pain     Lefty upper molar needing root canal, [facility] will not do any more work on her mouth, pt. Feelsd tooth is infected, having dental pain,pain started to get worse 2 days ago, pt. Treating with aleve and tylenol with no relief, pt. Can taste a foul drainage and foul breathe, swelling       History provided by:  Medical records and patient  Dental Pain  Location:  Upper  Upper teeth location:  15/SAYRA 2nd molar  Quality:  Aching and dull  Severity:  Moderate  Onset quality:  Gradual  Duration:  3 days  Timing:  Constant  Progression:  Unchanged  Chronicity:  Chronic  Context comment:  History of severe dental disease, requiring root canal, is known to [employer], has plans to have root canal of the left upper second molar. Relieved by:  Nothing  Worsened by:  Cold food/drink, hot food/drink, touching and pressure  Ineffective treatments:  None tried  Associated symptoms: no congestion, no difficulty swallowing, no drooling, no facial pain, no facial swelling, no fever, no gum swelling, no headaches, no neck pain, no neck swelling, no oral bleeding, no oral lesions and no trismus        Prior to Admission Medications   Prescriptions Last Dose Informant Patient Reported? Taking?   lacosamide (VIMPAT) 200 mg tablet 10/28/2023  No Yes   Sig: Take 1 tablet (200 mg total) by mouth every 12 (twelve) hours   multivitamin (THERAGRAN) TABS   Yes No   Sig: Take 1 tablet by mouth daily      Facility-Administered Medications: None       Past Medical History:   Diagnosis Date    Epilepsy ([address])     Seizure ([address])        History reviewed. No pertinent surgical history. History reviewed. No pertinent family history. I have reviewed and agree with the history as documented.     E-Cigarette/Vaping    E-Cigarette Use Never User      E-Cigarette/Vaping Substances    Nicotine No     THC No     CBD No     Flavoring No      Social History     Tobacco Use    Smoking status: Never     Passive exposure: Never    Smokeless tobacco: Never   Vaping Use    Vaping Use: Never used   Substance Use Topics    Alcohol use: Not Currently    Drug use: Not Currently       Review of Systems   Constitutional:  Negative for chills, fatigue and fever. HENT:  Positive for dental problem. Negative for congestion, drooling, ear discharge, ear pain, facial swelling, mouth sores, rhinorrhea and sore throat. Eyes:  Negative for pain and visual disturbance. Respiratory:  Negative for cough and shortness of breath. Cardiovascular:  Negative for chest pain and palpitations. Gastrointestinal:  Negative for abdominal pain, diarrhea, nausea and vomiting. Endocrine: Negative for polydipsia, polyphagia and polyuria. Genitourinary:  Negative for difficulty urinating, dysuria, flank pain and hematuria. Musculoskeletal:  Negative for arthralgias, back pain and neck pain. Skin:  Negative for color change and rash. Allergic/Immunologic: Negative for immunocompromised state. Neurological:  Negative for dizziness, seizures, syncope, weakness and headaches. Psychiatric/Behavioral:  Negative for confusion and self-injury. The patient is not nervous/anxious. All other systems reviewed and are negative. Physical Exam  Physical Exam  Vitals and nursing note reviewed. Constitutional:       General: She is not in acute distress. Appearance: Normal appearance. She is not ill-appearing, toxic-appearing or diaphoretic. HENT:      Head: Normocephalic and atraumatic. Nose: Nose normal. No congestion or rhinorrhea. Mouth/Throat:      Mouth: Mucous membranes are moist.      Pharynx: Oropharynx is clear. No oropharyngeal exudate or posterior oropharyngeal erythema. Comments: Severe decay of numerous teeth, most notably in the left upper second molar, tenderness to percussion of the left upper second molar. No significant erythema or swelling surrounding this tooth.   Eyes: General:         Right eye: No discharge. Left eye: No discharge. Extraocular Movements: Extraocular movements intact. Conjunctiva/sclera: Conjunctivae normal.      Pupils: Pupils are equal, round, and reactive to light. Cardiovascular:      Rate and Rhythm: Normal rate and regular rhythm. Pulses: Normal pulses. Pulmonary:      Effort: Pulmonary effort is normal. No respiratory distress. Musculoskeletal:         General: Normal range of motion. Cervical back: Normal range of motion and neck supple. Skin:     General: Skin is warm and dry. Capillary Refill: Capillary refill takes less than 2 seconds. Neurological:      General: No focal deficit present. Mental Status: She is alert and oriented to person, place, and time. Cranial Nerves: No cranial nerve deficit. Sensory: No sensory deficit. Motor: No weakness. Coordination: Coordination normal.      Gait: Gait normal.      Deep Tendon Reflexes: Reflexes normal.   Psychiatric:         Mood and Affect: Mood normal.         Behavior: Behavior normal.         Thought Content:  Thought content normal.         Judgment: Judgment normal.         Vital Signs  ED Triage Vitals [10/28/23 0853]   Temperature Pulse Respirations Blood Pressure SpO2   (!) 97.2 °F (36.2 °C) 68 20 (!) 191/111 99 %      Temp Source Heart Rate Source Patient Position - Orthostatic VS BP Location FiO2 (%)   Temporal Monitor Sitting Right arm --      Pain Score       10 - Worst Possible Pain           Vitals:    10/28/23 0853   BP: (!) 191/111   Pulse: 68   Patient Position - Orthostatic VS: Sitting         Visual Acuity      ED Medications  Medications   penicillin V potassium (VEETID) tablet 500 mg (500 mg Oral Given 10/28/23 0905)   ibuprofen (MOTRIN) tablet 600 mg (600 mg Oral Given 10/28/23 3178)       Diagnostic Studies  Results Reviewed       None                   No orders to display              Procedures  Procedures ED Course                                             Medical Decision Making  3754: Patient appears well, vital signs reviewed. Patient has severe decay of numerous teeth, most notably the left upper second molar. There is no gross abscess of note on exam however there is significant tenderness to percussion. There is no evidence of malignant infection. Plan to start on antibiotics. Patient to follow-up with her dentist for further care, has plans for root canal.             Disposition  Final diagnoses:   Tooth pain     Time reflects when diagnosis was documented in both MDM as applicable and the Disposition within this note       Time User Action Codes Description Comment    10/28/2023  9:02 AM Nicolas Samra Juan Carlos [K08.89] Tooth pain           ED Disposition       ED Disposition   Discharge    Condition   Stable    Date/Time   Sat Oct 28, 2023  9:02 AM    Comment   Jeni Adkins discharge to home/self care. Follow-up Information    None         Patient's Medications   Discharge Prescriptions    PENICILLIN V POTASSIUM (VEETID) 500 MG TABLET    Take 1 tablet (500 mg total) by mouth 4 (four) times a day for 7 days       Start Date: 10/28/2023End Date: 11/4/2023       Order Dose: 500 mg       Quantity: 28 tablet    Refills: 0       No discharge procedures on file.     PDMP Review       None            ED Provider  Electronically Signed by             Holly Boles MD  10/28/23 6437

## 2023-10-28 NOTE — ED NOTES
Pt in no acute distress. Ambulates with a steady gait.  Verbalizes understanding of discharge instructions       Haleigh Anderson RN  10/28/23 4242

## 2024-06-04 ENCOUNTER — HOSPITAL ENCOUNTER (EMERGENCY)
Facility: HOSPITAL | Age: 44
Discharge: HOME | End: 2024-06-04
Attending: EMERGENCY MEDICINE
Payer: COMMERCIAL

## 2024-06-04 VITALS
HEART RATE: 65 BPM | BODY MASS INDEX: 25.9 KG/M2 | RESPIRATION RATE: 20 BRPM | DIASTOLIC BLOOD PRESSURE: 87 MMHG | WEIGHT: 165 LBS | OXYGEN SATURATION: 97 % | HEIGHT: 67 IN | TEMPERATURE: 98.1 F | SYSTOLIC BLOOD PRESSURE: 164 MMHG

## 2024-06-04 DIAGNOSIS — N39.0 UTI (URINARY TRACT INFECTION): Primary | ICD-10-CM

## 2024-06-04 DIAGNOSIS — B37.31 VAGINAL CANDIDIASIS: ICD-10-CM

## 2024-06-04 LAB
BACTERIA UR QL AUTO: ABNORMAL /HPF
BILIRUB UR QL STRIP: ABNORMAL
CLARITY UR: ABNORMAL
COLOR UR: YELLOW
GLUCOSE UR STRIP-MCNC: NEGATIVE MG/DL
HGB UR QL STRIP.AUTO: ABNORMAL
KETONES UR STRIP-MCNC: ABNORMAL MG/DL
LEUKOCYTE ESTERASE UR QL STRIP: ABNORMAL
NITRITE UR QL STRIP: NEGATIVE
NON-SQ EPI CELLS URNS QL MICRO: ABNORMAL /HPF
PH UR STRIP.AUTO: 6 [PH]
PROT UR STRIP-MCNC: NEGATIVE MG/DL
RBC #/AREA URNS AUTO: ABNORMAL /HPF
SP GR UR STRIP.AUTO: 1.02 (ref 1–1.03)
UROBILINOGEN UR QL STRIP.AUTO: 0.2 E.U./DL
WBC #/AREA URNS AUTO: ABNORMAL /HPF

## 2024-06-04 PROCEDURE — 87147 CULTURE TYPE IMMUNOLOGIC: CPT | Performed by: PHYSICIAN ASSISTANT

## 2024-06-04 PROCEDURE — 81001 URINALYSIS AUTO W/SCOPE: CPT | Performed by: PHYSICIAN ASSISTANT

## 2024-06-04 PROCEDURE — 87591 N.GONORRHOEAE DNA AMP PROB: CPT | Performed by: PHYSICIAN ASSISTANT

## 2024-06-04 PROCEDURE — 87491 CHLMYD TRACH DNA AMP PROBE: CPT | Performed by: PHYSICIAN ASSISTANT

## 2024-06-04 PROCEDURE — 99283 EMERGENCY DEPT VISIT LOW MDM: CPT

## 2024-06-04 PROCEDURE — 99284 EMERGENCY DEPT VISIT MOD MDM: CPT | Performed by: PHYSICIAN ASSISTANT

## 2024-06-04 PROCEDURE — 87086 URINE CULTURE/COLONY COUNT: CPT | Performed by: PHYSICIAN ASSISTANT

## 2024-06-04 RX ORDER — CEPHALEXIN 500 MG/1
500 CAPSULE ORAL EVERY 12 HOURS SCHEDULED
Qty: 10 CAPSULE | Refills: 0 | Status: SHIPPED | OUTPATIENT
Start: 2024-06-04 | End: 2024-06-09

## 2024-06-04 RX ORDER — FLUCONAZOLE 150 MG/1
150 TABLET ORAL ONCE
Status: COMPLETED | OUTPATIENT
Start: 2024-06-04 | End: 2024-06-04

## 2024-06-04 RX ORDER — FLUCONAZOLE 150 MG/1
150 TABLET ORAL ONCE
Qty: 1 TABLET | Refills: 0 | Status: SHIPPED | OUTPATIENT
Start: 2024-06-04 | End: 2024-06-04

## 2024-06-04 RX ORDER — CEPHALEXIN 250 MG/1
500 CAPSULE ORAL ONCE
Status: COMPLETED | OUTPATIENT
Start: 2024-06-04 | End: 2024-06-04

## 2024-06-04 RX ADMIN — FLUCONAZOLE 150 MG: 150 TABLET ORAL at 15:01

## 2024-06-04 RX ADMIN — CEPHALEXIN 500 MG: 250 CAPSULE ORAL at 15:01

## 2024-06-04 NOTE — DISCHARGE INSTRUCTIONS
Please take antibiotics as prescribed for questionable urinary tract infection.  You are given 1 dose of medication for suspected yeast infection today.  If your symptoms are not resolved in 72 hours please take the second dose sent to the pharmacy.  Additionally please make a follow-up appoint with your gynecologist.  Will call you with any positive test results.  Please return with new or worsening symptoms

## 2024-06-04 NOTE — ED PROVIDER NOTES
"History  Chief Complaint   Patient presents with    Vaginal Discharge     Started with yeast infection last week. Tried OTC medication with no relief. Now has off white discharge with smell.     44 year presents to the ED for evaluation of vaginal itch. States this started last Thursday. States increased white vaginal discharge. Notes \"it has a little funk.\" Notes she thought she had a yeast infection. Notes she tried monostat and did not have improvement of symptoms. States urine is cloudy and reports some dysuria. Denies dysuria or frequency.  Reports she was concerned for possible UTI.  Also states she previously gotten a yeast infection from using [employer] soap and reports she had used a \"blueberry lube\" prior to the onset of symptoms and therefore is concerned she may have another yeast infection.  She denies concern for STD. She denies chance of pregnancy. She denies fevers or chills. She denies pain. She denies lesions.  Status post hysterectomy        Prior to Admission Medications   Prescriptions Last Dose Informant Patient Reported? Taking?   lacosamide (VIMPAT) 200 mg tablet 6/4/2024  No Yes   Sig: Take 1 tablet (200 mg total) by mouth every 12 (twelve) hours   multivitamin (THERAGRAN) TABS Not Taking  Yes No   Sig: Take 1 tablet by mouth daily   Patient not taking: Reported on 6/4/2024      Facility-Administered Medications: None       Past Medical History:   Diagnosis Date    Epilepsy ([facility])     Seizure ([facility])        History reviewed. No pertinent surgical history.    History reviewed. No pertinent family history.  I have reviewed and agree with the history as documented.    E-Cigarette/Vaping    E-Cigarette Use Never User      E-Cigarette/Vaping Substances    Nicotine No     THC No     CBD No     Flavoring No      Social History     Tobacco Use    Smoking status: Never     Passive exposure: Never    Smokeless tobacco: Never   Vaping Use    Vaping status: Never Used   Substance Use Topics    " Alcohol use: Not Currently    Drug use: Not Currently       Review of Systems   Constitutional:  Negative for appetite change, fatigue and fever.   HENT: Negative.     Respiratory: Negative.     Cardiovascular: Negative.    Genitourinary:  Positive for dysuria and vaginal discharge. Negative for frequency and hematuria.        Vaginal itch   Skin: Negative.    All other systems reviewed and are negative.      Physical Exam  Physical Exam  Vitals and nursing note reviewed.   Constitutional:       Appearance: Normal appearance.   HENT:      Head: Normocephalic.   Eyes:      Conjunctiva/sclera: Conjunctivae normal.   Pulmonary:      Effort: Pulmonary effort is normal.   Abdominal:      General: Abdomen is flat. There is no distension.      Palpations: Abdomen is soft.      Tenderness: There is no abdominal tenderness.   Musculoskeletal:         General: Normal range of motion.   Skin:     General: Skin is warm and dry.   Neurological:      General: No focal deficit present.      Mental Status: She is alert.         Vital Signs  ED Triage Vitals [06/04/24 1353]   Temperature Pulse Respirations Blood Pressure SpO2   98.1 °F (36.7 °C) 72 20 (!) 185/106 99 %      Temp Source Heart Rate Source Patient Position - Orthostatic VS BP Location FiO2 (%)   Temporal Monitor Sitting Right arm --      Pain Score       5           Vitals:    06/04/24 1353 06/04/24 1502   BP: (!) 185/106 164/87   Pulse: 72 65   Patient Position - Orthostatic VS: Sitting Sitting         Visual Acuity      ED Medications  Medications   fluconazole (DIFLUCAN) tablet 150 mg (150 mg Oral Given 6/4/24 1501)   cephalexin (KEFLEX) capsule 500 mg (500 mg Oral Given 6/4/24 1501)       Diagnostic Studies  Results Reviewed       Procedure Component Value Units Date/Time    Urine Microscopic [[id number]]  (Abnormal) Collected: 06/04/24 1412    Lab Status: Final result Specimen: Urine, Clean Catch Updated: 06/04/24 1441     RBC, UA 0-1 /hpf      WBC, UA 10-20 /hpf       Epithelial Cells Occasional /hpf      Bacteria, UA Occasional /hpf     Urine culture [[id number]] Collected: 06/04/24 1412    Lab Status: In process Specimen: Urine, Clean Catch Updated: 06/04/24 1441    UA w Reflex to Microscopic w Reflex to Culture [[id number]]  (Abnormal) Collected: 06/04/24 1412    Lab Status: Final result Specimen: Urine, Clean Catch Updated: 06/04/24 1424     Color, UA Yellow     Clarity, UA Slightly Cloudy     Specific Gravity, UA 1.025     pH, UA 6.0     Leukocytes, UA Moderate     Nitrite, UA Negative     Protein, UA Negative mg/dl      Glucose, UA Negative mg/dl      Ketones, UA Trace mg/dl      Urobilinogen, UA 0.2 E.U./dl      Bilirubin, UA Small     Occult Blood, UA Trace-Intact    Chlamydia/GC amplified DNA by PCR [[id number]] Collected: 06/04/24 1412    Lab Status: In process Specimen: Urine, Other Updated: 06/04/24 1419                   No orders to display              Procedures  Procedures         ED Course  ED Course as of 06/04/24 1621   Tue Jun 04, 2024   1445 UA questionable for UTI. Due to symptoms will start on abx. Will also cover possible yeast infection. Will have patient follow up with PCP.                                SBIRT 22yo+      Flowsheet Row Most Recent Value   Initial Alcohol Screen: US AUDIT-C     1. How often do you have a drink containing alcohol? 0 Filed at: 06/04/2024 1352   2. How many drinks containing alcohol do you have on a typical day you are drinking?  0 Filed at: 06/04/2024 1352   3b. FEMALE Any Age, or MALE 65+: How often do you have 4 or more drinks on one occassion? 0 Filed at: 06/04/2024 1352   Audit-C Score 0 Filed at: 06/04/2024 1352   DAVIDA: How many times in the past year have you...    Used an illegal drug or used a prescription medication for non-medical reasons? Never Filed at: 06/04/2024 1352                      Medical Decision Making  44-year-old female presented to the emergency department for evaluation of increased vaginal  discharge, vaginal itch, visual dysuria.  Vitals and medical record reviewed.  Patient at risk for vomiting limited to vaginal candidiasis, gonorrhea, chlamydia, bacterial vaginosis, UTI.  UA was questionable for UTI therefore patient was started on oral antibiotics.  Will additionally treat for suspected yeast infection with Diflucan.  We discussed appropriate symptomatic treatment at home strict return precautions and follow-up and she verbalized understanding.  She is clinically and hemodynamically stable for discharge    Problems Addressed:  UTI (urinary tract infection): acute illness or injury  Vaginal candidiasis: acute illness or injury    Amount and/or Complexity of Data Reviewed  Labs: ordered.    Risk  Prescription drug management.             Disposition  Final diagnoses:   UTI (urinary tract infection)   Vaginal candidiasis     Time reflects when diagnosis was documented in both MDM as applicable and the Disposition within this note       Time User Action Codes Description Comment    6/4/2024  2:52 PM Teri Negron Add [N39.0] UTI (urinary tract infection)     6/4/2024  2:52 PM Teri Negron Add [B37.31] Vaginal candidiasis           ED Disposition       ED Disposition   Discharge    Condition   Stable    Date/Time   Tue Jun 4, 2024 1452    Comment   Danuta Healy discharge to home/self care.                   Follow-up Information       Follow up With Specialties Details Why Contact Info    Ericka Sethi MD    [address] [place] [id number] [phone]      Aracelis Negrete MD Obstetrics and Gynecology, Obstetrics, Gynecology   [address] [place] [address] [id number] [phone]              Discharge Medication List as of 6/4/2024  2:53 PM        START taking these medications    Details   cephalexin (KEFLEX) 500 mg capsule Take 1 capsule (500 mg total) by mouth every 12 (twelve) hours for 5 days, Starting Tue 6/4/2024, Until Sun 6/9/2024, Normal      fluconazole (DIFLUCAN) 150  mg tablet Take 1 tablet (150 mg total) by mouth once for 1 dose, Starting Tue 6/4/2024, Normal           CONTINUE these medications which have NOT CHANGED    Details   lacosamide (VIMPAT) 200 mg tablet Take 1 tablet (200 mg total) by mouth every 12 (twelve) hours, Starting Fri 5/5/2023, Until Tue 6/4/2024, Print      multivitamin (THERAGRAN) TABS Take 1 tablet by mouth daily, Historical Med             No discharge procedures on file.    PDMP Review       None            ED Provider  Electronically Signed by             Teri Negron PA-C  06/04/24 8288

## 2024-06-05 LAB
C TRACH DNA SPEC QL NAA+PROBE: NEGATIVE
N GONORRHOEA DNA SPEC QL NAA+PROBE: NEGATIVE

## 2024-06-06 LAB
BACTERIA UR CULT: ABNORMAL
BACTERIA UR CULT: ABNORMAL

## 2024-06-09 ENCOUNTER — HOSPITAL ENCOUNTER (EMERGENCY)
Facility: HOSPITAL | Age: 44
Discharge: HOME | End: 2024-06-09
Attending: EMERGENCY MEDICINE
Payer: COMMERCIAL

## 2024-06-09 VITALS
OXYGEN SATURATION: 98 % | HEART RATE: 65 BPM | TEMPERATURE: 98 F | RESPIRATION RATE: 16 BRPM | SYSTOLIC BLOOD PRESSURE: 146 MMHG | DIASTOLIC BLOOD PRESSURE: 68 MMHG

## 2024-06-09 DIAGNOSIS — N76.0 BACTERIAL VAGINOSIS: Primary | ICD-10-CM

## 2024-06-09 DIAGNOSIS — N39.0 UTI (URINARY TRACT INFECTION): ICD-10-CM

## 2024-06-09 DIAGNOSIS — B96.89 BACTERIAL VAGINOSIS: Primary | ICD-10-CM

## 2024-06-09 LAB
BACTERIA UR QL AUTO: ABNORMAL /HPF
BILIRUB UR QL STRIP: ABNORMAL
CLARITY UR: CLEAR
COLOR UR: YELLOW
GLUCOSE UR STRIP-MCNC: NEGATIVE MG/DL
HGB UR QL STRIP.AUTO: ABNORMAL
KETONES UR STRIP-MCNC: NEGATIVE MG/DL
LEUKOCYTE ESTERASE UR QL STRIP: ABNORMAL
MUCOUS THREADS UR QL AUTO: ABNORMAL
NITRITE UR QL STRIP: NEGATIVE
NON-SQ EPI CELLS URNS QL MICRO: ABNORMAL /HPF
PH UR STRIP.AUTO: 6 [PH]
PROT UR STRIP-MCNC: NEGATIVE MG/DL
RBC #/AREA URNS AUTO: ABNORMAL /HPF
SP GR UR STRIP.AUTO: 1.02 (ref 1–1.03)
UROBILINOGEN UR QL STRIP.AUTO: 0.2 E.U./DL
WBC #/AREA URNS AUTO: ABNORMAL /HPF

## 2024-06-09 PROCEDURE — 81001 URINALYSIS AUTO W/SCOPE: CPT | Performed by: PHYSICIAN ASSISTANT

## 2024-06-09 PROCEDURE — 99284 EMERGENCY DEPT VISIT MOD MDM: CPT | Performed by: PHYSICIAN ASSISTANT

## 2024-06-09 PROCEDURE — 99283 EMERGENCY DEPT VISIT LOW MDM: CPT

## 2024-06-09 RX ORDER — METRONIDAZOLE 500 MG/1
500 TABLET ORAL ONCE
Status: DISCONTINUED | OUTPATIENT
Start: 2024-06-09 | End: 2024-06-09 | Stop reason: HOSPADM

## 2024-06-09 RX ORDER — NITROFURANTOIN 25; 75 MG/1; MG/1
100 CAPSULE ORAL 2 TIMES DAILY
Qty: 10 CAPSULE | Refills: 0 | Status: SHIPPED | OUTPATIENT
Start: 2024-06-09 | End: 2024-06-09

## 2024-06-09 RX ORDER — NITROFURANTOIN 25; 75 MG/1; MG/1
100 CAPSULE ORAL 2 TIMES DAILY
Qty: 10 CAPSULE | Refills: 0 | Status: SHIPPED | OUTPATIENT
Start: 2024-06-09

## 2024-06-09 RX ORDER — METRONIDAZOLE 500 MG/1
500 TABLET ORAL EVERY 12 HOURS SCHEDULED
Qty: 14 TABLET | Refills: 0 | Status: SHIPPED | OUTPATIENT
Start: 2024-06-09 | End: 2024-06-16

## 2024-06-09 RX ORDER — METRONIDAZOLE 500 MG/1
500 TABLET ORAL EVERY 12 HOURS SCHEDULED
Qty: 14 TABLET | Refills: 0 | Status: SHIPPED | OUTPATIENT
Start: 2024-06-09 | End: 2024-06-09

## 2024-06-09 NOTE — DISCHARGE INSTRUCTIONS
Please take medications as prescribed.  Is importantly follow-up with OB/GYN.  Please return to the emergency department any new or worsening symptoms

## 2024-06-09 NOTE — ED PROVIDER NOTES
History  Chief Complaint   Patient presents with    Vaginal Pain     Patient reports she was here 5 days ago and diagnosed with UTI and yeast infection. Reports symptoms continue.      44 year old female presents to the ED for evaluation of vaginal discharge.  Patient was seen in the emergency department 5 days ago for the same.  At that point she was diagnosed with yeast infection and UTI.  Reports she took Diflucan x 2 and a full course of Keflex without improvement of symptoms.  States she continues with discharge which is yellow in color and foul-smelling.  Gonorrhea and Chlamydia test results negative from 5 days ago.  She denies any recent sexual intercourse.  She denies any fevers or chills.  Has not followed up with OB/GYN.  She denies dysuria hematuria or frequency.  Continues with mild vaginal itch improved from previous.        Prior to Admission Medications   Prescriptions Last Dose Informant Patient Reported? Taking?   cephalexin (KEFLEX) 500 mg capsule   No No   Sig: Take 1 capsule (500 mg total) by mouth every 12 (twelve) hours for 5 days   lacosamide (VIMPAT) 200 mg tablet   No No   Sig: Take 1 tablet (200 mg total) by mouth every 12 (twelve) hours   multivitamin (THERAGRAN) TABS   Yes No   Sig: Take 1 tablet by mouth daily   Patient not taking: Reported on 6/4/2024      Facility-Administered Medications: None       Past Medical History:   Diagnosis Date    Epilepsy (HCC)     Seizure (HCC)        History reviewed. No pertinent surgical history.    History reviewed. No pertinent family history.  I have reviewed and agree with the history as documented.    E-Cigarette/Vaping    E-Cigarette Use Never User      E-Cigarette/Vaping Substances    Nicotine No     THC No     CBD No     Flavoring No      Social History     Tobacco Use    Smoking status: Never     Passive exposure: Never    Smokeless tobacco: Never   Vaping Use    Vaping status: Never Used   Substance Use Topics    Alcohol use: Not Currently     Drug use: Not Currently       Review of Systems   Constitutional: Negative.    Respiratory: Negative.     Cardiovascular: Negative.    Genitourinary:  Positive for vaginal discharge. Negative for difficulty urinating, flank pain, frequency, genital sores, hematuria, pelvic pain, urgency, vaginal bleeding and vaginal pain.        Vaginal itch     Musculoskeletal: Negative.    Neurological: Negative.    All other systems reviewed and are negative.      Physical Exam  Physical Exam  Vitals and nursing note reviewed.   Constitutional:       General: She is not in acute distress.     Appearance: Normal appearance. She is not ill-appearing, toxic-appearing or diaphoretic.   HENT:      Head: Normocephalic.   Eyes:      Conjunctiva/sclera: Conjunctivae normal.   Pulmonary:      Effort: Pulmonary effort is normal.   Musculoskeletal:         General: Normal range of motion.   Skin:     General: Skin is warm and dry.      Findings: No erythema or rash.   Neurological:      General: No focal deficit present.      Mental Status: She is alert.   Psychiatric:         Mood and Affect: Mood normal.         Vital Signs  ED Triage Vitals [06/09/24 1605]   Temperature Pulse Respirations Blood Pressure SpO2   98 °F (36.7 °C) 65 16 146/68 98 %      Temp Source Heart Rate Source Patient Position - Orthostatic VS BP Location FiO2 (%)   Temporal Monitor Lying Right arm --      Pain Score       No Pain           Vitals:    06/09/24 1605   BP: 146/68   Pulse: 65   Patient Position - Orthostatic VS: Lying         Visual Acuity      ED Medications  Medications   metroNIDAZOLE (FLAGYL) tablet 500 mg (500 mg Oral Not Given 6/9/24 1656)       Diagnostic Studies  Results Reviewed       Procedure Component Value Units Date/Time    Urine Microscopic [[id number]]  (Abnormal) Collected: 06/09/24 1611    Lab Status: Final result Specimen: Urine, Clean Catch Updated: 06/09/24 1641     RBC, UA 4-10 /hpf      WBC, UA 30-50 /hpf      Epithelial Cells  Occasional /hpf      Bacteria, UA Occasional /hpf      MUCUS THREADS Moderate    UA (URINE) with reflex to Scope [[id number]]  (Abnormal) Collected: 06/09/24 1611    Lab Status: Final result Specimen: Urine, Clean Catch Updated: 06/09/24 1619     Color, UA Yellow     Clarity, UA Clear     Specific Gravity, UA 1.025     pH, UA 6.0     Leukocytes, UA Moderate     Nitrite, UA Negative     Protein, UA Negative mg/dl      Glucose, UA Negative mg/dl      Ketones, UA Negative mg/dl      Urobilinogen, UA 0.2 E.U./dl      Bilirubin, UA Small     Occult Blood, UA Trace-Intact                   No orders to display              Procedures  Procedures         ED Course  ED Course as of 06/09/24 1745   Sun Jun 09, 2024   1642 UA similar to 5 days ago.  Will trial Macrobid and treat for BV with Flagyl                               SBIRT 20yo+      Flowsheet Row Most Recent Value   Initial Alcohol Screen: US AUDIT-C     1. How often do you have a drink containing alcohol? 0 Filed at: 06/09/2024 1610   2. How many drinks containing alcohol do you have on a typical day you are drinking?  0 Filed at: 06/09/2024 1610   3a. Male UNDER 65: How often do you have five or more drinks on one occasion? 0 Filed at: 06/09/2024 1610   3b. FEMALE Any Age, or MALE 65+: How often do you have 4 or more drinks on one occassion? 0 Filed at: 06/09/2024 1610   Audit-C Score 0 Filed at: 06/09/2024 1610   DAVIDA: How many times in the past year have you...    Used an illegal drug or used a prescription medication for non-medical reasons? Never Filed at: 06/09/2024 1610                      Medical Decision Making  44-year-old female presented to the emergency department for evaluation of vaginal discharge.  Vitals and medical record reviewed.  Patient at risk for the following but not limited to BV, STD, UTI, vaginal candidiasis.  UA continues to be questionable for urinary tract infection.  She was started on course of Macrobid.  She had no improvement of  symptoms with Diflucan, less likely be a vaginal candidiasis.  Recent STD testing negative.  Will treat for BV with Flagyl.  Appropriate follow-up with gynecology was discussed and she verbalized understanding.  She is clinically and hemodynamically stable for discharge    Problems Addressed:  Bacterial vaginosis: acute illness or injury  UTI (urinary tract infection): acute illness or injury    Amount and/or Complexity of Data Reviewed  Labs: ordered.    Risk  Prescription drug management.             Disposition  Final diagnoses:   Bacterial vaginosis   UTI (urinary tract infection)     Time reflects when diagnosis was documented in both MDM as applicable and the Disposition within this note       Time User Action Codes Description Comment    6/9/2024  4:40 PM Teri Negron Add [N76.0,  B96.89] Bacterial vaginosis     6/9/2024  4:48 PM Teri Negron Add [N39.0] UTI (urinary tract infection)           ED Disposition       ED Disposition   Discharge    Condition   Stable    Date/Time   Sun Jun 9, 2024 1640    Comment   Danuta Healy discharge to home/self care.                   Follow-up Information       Follow up With Specialties Details Why Contact Info    Ericka Sethi MD    [address] [place] [id number] [phone]      Aracelis Negrete MD Obstetrics and Gynecology, Obstetrics, Gynecology   [address] [place] [id number]  [phone]              Discharge Medication List as of 6/9/2024  4:49 PM        START taking these medications    Details   metroNIDAZOLE (FLAGYL) 500 mg tablet Take 1 tablet (500 mg total) by mouth every 12 (twelve) hours for 7 days, Starting Sun 6/9/2024, Until Sun 6/16/2024, Normal      nitrofurantoin (MACROBID) 100 mg capsule Take 1 capsule (100 mg total) by mouth 2 (two) times a day, Starting Sun 6/9/2024, Normal           CONTINUE these medications which have NOT CHANGED    Details   cephalexin (KEFLEX) 500 mg capsule Take 1 capsule (500 mg total) by  mouth every 12 (twelve) hours for 5 days, Starting Tue 6/4/2024, Until Sun 6/9/2024, Normal      lacosamide (VIMPAT) 200 mg tablet Take 1 tablet (200 mg total) by mouth every 12 (twelve) hours, Starting Fri 5/5/2023, Until Tue 6/4/2024, Print      multivitamin (THERAGRAN) TABS Take 1 tablet by mouth daily, Historical Med             No discharge procedures on file.    PDMP Review       None            ED Provider  Electronically Signed by             Teri Negron PA-C  06/09/24 5525

## 2024-08-13 ENCOUNTER — OFFICE VISIT (OUTPATIENT)
Dept: GYNECOLOGY | Facility: CLINIC | Age: 44
End: 2024-08-13
Payer: COMMERCIAL

## 2024-08-13 VITALS
RESPIRATION RATE: 20 BRPM | SYSTOLIC BLOOD PRESSURE: 134 MMHG | WEIGHT: 170.8 LBS | BODY MASS INDEX: 26.81 KG/M2 | HEIGHT: 67 IN | DIASTOLIC BLOOD PRESSURE: 46 MMHG | TEMPERATURE: 97.3 F | OXYGEN SATURATION: 99 % | HEART RATE: 64 BPM

## 2024-08-13 DIAGNOSIS — Z01.419 ENCOUNTER FOR GYNECOLOGICAL EXAMINATION WITHOUT ABNORMAL FINDING: Primary | ICD-10-CM

## 2024-08-13 DIAGNOSIS — Z12.31 OTHER SCREENING MAMMOGRAM: ICD-10-CM

## 2024-08-13 PROCEDURE — 99386 PREV VISIT NEW AGE 40-64: CPT | Performed by: OBSTETRICS & GYNECOLOGY

## 2024-08-13 NOTE — PROGRESS NOTES
Ambulatory Visit  Name: Danuta Healy      : 1980      MRN: [id number]  Encounter Provider: Thai Hong DO  Encounter Date: 2024   Encounter department: [facility] [place]    Assessment & Plan   1. Encounter for gynecological examination without abnormal finding      History of Present Illness     Danuta Healy is a 44 y.o. female  C section x 1, new patient who presents for annual examination.  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  She has been seen in the emergency department x 2 over the past month with vaginal discharge and irritation.  She was diagnosed with a urinary tract infection and bacterial vaginosis.  She was placed on metronidazole and Macrobid.  She denies any vaginal irritation, burning or discharge.  Denies any dysuria, hematuria urgency urinary incontinence.  No GI complaints.  No vasomotor symptoms.    Review of Systems   Constitutional: Negative.    HENT:  Negative for sore throat and trouble swallowing.    Gastrointestinal: Negative.    Genitourinary: Negative.      Past Medical History   Past Medical History:   Diagnosis Date    Epilepsy (HCC)     Seizure (HCC)      No past surgical history on file.  No family history on file.  Current Outpatient Medications on File Prior to Visit   Medication Sig Dispense Refill    multivitamin (THERAGRAN) TABS Take 1 tablet by mouth daily      lacosamide (VIMPAT) 200 mg tablet Take 1 tablet (200 mg total) by mouth every 12 (twelve) hours 180 tablet 0    nitrofurantoin (MACROBID) 100 mg capsule Take 1 capsule (100 mg total) by mouth 2 (two) times a day (Patient not taking: Reported on 2024) 10 capsule 0     No current facility-administered medications on file prior to visit.     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  Unknown trigger.      Morphine Fever, Hives, Itching, Other (See Comments), Rash and Vomiting     Other reaction(s): RASH, HIVES      Lisinopril Cough     Other reaction(s): ANGIOEDEMA, COUGH    Medical Tape Rash     Skin blisters  Skin blisters      Wound Dressings Rash      Current Outpatient Medications on File Prior to Visit   Medication Sig Dispense Refill    multivitamin (THERAGRAN) TABS Take 1 tablet by mouth daily      lacosamide (VIMPAT) 200 mg tablet Take 1 tablet (200 mg total) by mouth every 12 (twelve) hours 180 tablet 0    nitrofurantoin (MACROBID) 100 mg capsule Take 1 capsule (100 mg total) by mouth 2 (two) times a day (Patient not taking: Reported on 8/13/2024) 10 capsule 0     No current facility-administered medications on file prior to visit.      Social History     Tobacco Use    Smoking status: Never     Passive exposure: Never    Smokeless tobacco: Never   Vaping Use    Vaping status: Never Used   Substance and Sexual Activity    Alcohol use: Not Currently    Drug use: Not Currently    Sexual activity: Yes     Objective     BP (!) 134/46 (BP Location: Left arm, Patient Position: Sitting, Cuff Size: Standard)   Pulse 64   Temp (!) 97.3 °F (36.3 °C)   Resp 20   Ht 5' 7\" (1.702 m)   Wt 77.5 kg (170 lb 12.8 oz)   SpO2 99%   BMI 26.75 kg/m²     Physical Exam  Vitals reviewed.   Constitutional:       Appearance: Normal appearance. She is normal weight.   Cardiovascular:      Rate and Rhythm: Normal rate and regular rhythm.      Pulses: Normal pulses.      Heart sounds: Normal heart sounds. No murmur heard.  Pulmonary:      Effort: Pulmonary effort is normal. No respiratory distress.      Breath sounds: Normal breath sounds.   Chest:   Breasts:     Right: No swelling, bleeding, inverted nipple, mass, nipple discharge, skin change or tenderness.      Left: No swelling, bleeding, inverted nipple, mass, nipple discharge, skin " change or tenderness.   Abdominal:      General: There is no distension.      Palpations: Abdomen is soft. There is no mass.      Tenderness: There is no abdominal tenderness. There is no guarding or rebound.      Hernia: No hernia is present. There is no hernia in the left inguinal area or right inguinal area.   Genitourinary:     General: Normal vulva.      Labia:         Right: No rash, tenderness or lesion.         Left: No rash, tenderness or lesion.       Vagina: Normal.      Uterus: Absent.       Adnexa:         Right: No mass, tenderness or fullness.          Left: No mass, tenderness or fullness.     Musculoskeletal:      Cervical back: Normal range of motion and neck supple. No tenderness.   Lymphadenopathy:      Cervical: No cervical adenopathy.      Upper Body:      Right upper body: No supraclavicular, axillary or pectoral adenopathy.      Left upper body: No supraclavicular, axillary or pectoral adenopathy.      Lower Body: No right inguinal adenopathy. No left inguinal adenopathy.   Neurological:      Mental Status: She is alert.       Administrative Statements

## 2024-08-30 ENCOUNTER — NURSE TRIAGE (OUTPATIENT)
Age: 44
End: 2024-08-30

## 2024-08-30 DIAGNOSIS — B96.89 BV (BACTERIAL VAGINOSIS): Primary | ICD-10-CM

## 2024-08-30 DIAGNOSIS — N76.0 BV (BACTERIAL VAGINOSIS): Primary | ICD-10-CM

## 2024-08-30 RX ORDER — METRONIDAZOLE 7.5 MG/G
1 GEL VAGINAL
Qty: 5 G | Refills: 0 | Status: SHIPPED | OUTPATIENT
Start: 2024-08-30 | End: 2024-09-04

## 2024-08-30 NOTE — TELEPHONE ENCOUNTER
"Spoke with patient who reports she was treated for BV in June and symptoms seemed to have resolved, however she is noticing the vaginal odor when having intercourse and a thick discharge that has an odor as well.  She denies any abd pain or other symptoms.  Medications sent to pharmacy per protocol.  Allergies and pharmacy reviewed.  She was advised not to have intercourse while doing the metrogel and for 3 days after.  She was advised if symptoms do not resolve she will need to come in for an appointment.  She understood and had no further questions or concerns at this time.    If patient has history of BV in prior two years, prescribe:    -Metrogel Intravaginally x 5 nights, with no refills.    Please instruct patient that they should not have sex while on treatment or 3 days after if using Metrogel.    If patient is having recurrent BV infections, please schedule appointment (should be seen within 5 days).      Reason for Disposition   Bad smelling vaginal discharge    Answer Assessment - Initial Assessment Questions  1. SYMPTOM: \"What's the main symptom you're concerned about?\" (e.g., pain, itching, dryness)      Vaginal odor with intercourse  2. TIME: \"When did this  start?\"      Few weeks ago  4. PAIN: \"Is there any pain?\" If Yes, ask: \"How bad is it?\" (Scale: 1-10; mild, moderate, severe)      denies  5. ITCHING: \"Is there any itching?\" If Yes, ask: \"How bad is it?\" (Scale: 1-10; mild, moderate, severe)      denies  6. CAUSE: \"What do you think is causing the discharge?\" \"Have you had the same problem before? What happened then?\"      Had BV in June, odor still continues.  7. OTHER SYMPTOMS: \"Do you have any other symptoms?\" (e.g., fever, itching, vaginal bleeding, pain with urination, injury to genital area, vaginal foreign body)      denies  8. PREGNANCY: \"Is there any chance you are pregnant?\" \"When was your last menstrual period?\"      hysterectomy    Answer Assessment - Initial Assessment Questions  2. " "ODOR: \"Is there a bad odor?\"      Yes    Protocols used: Vaginal Symptoms-ADULT-[place], Vaginal Discharge-ADULT-[place]    "

## 2025-01-20 ENCOUNTER — HOSPITAL ENCOUNTER (OUTPATIENT)
Dept: RADIOLOGY | Facility: CLINIC | Age: 45
Discharge: HOME | End: 2025-01-20
Payer: COMMERCIAL

## 2025-01-20 VITALS — BODY MASS INDEX: 27.47 KG/M2 | HEIGHT: 67 IN | WEIGHT: 175 LBS

## 2025-01-20 DIAGNOSIS — N64.59 ABNORMAL BREAST FINDING: ICD-10-CM

## 2025-01-20 DIAGNOSIS — R92.8 ABNORMAL MAMMOGRAM: ICD-10-CM

## 2025-01-20 PROCEDURE — 77066 DX MAMMO INCL CAD BI: CPT

## 2025-01-20 PROCEDURE — 76642 ULTRASOUND BREAST LIMITED: CPT

## 2025-01-20 PROCEDURE — G0279 TOMOSYNTHESIS, MAMMO: HCPCS

## 2025-01-20 NOTE — PROGRESS NOTES
Call placed to patient regarding recommendation for;    _____ RIGHT ___X___LEFT      _____Ultrasound guided  ___X___Stereotactic breast biopsy.    Pt states that procedure was explained to her, additional questions answered at this time    __X___Verbalized understanding.    Reviewed clip placement with patient, pt states understanding: Yes: __X__ No: ____  Comments:    Blood thinners: No: __X___ Yes: _____ What:     Biopsy teaching sheet given:  _____yes __X__no (All teaching points discussed during call, pt with no questions at this time, pt adv to arrive at 1200 for 1230 biopsy)    Pt given name/# for any further questions/needs    Pt agreeable to a post procedure call and states we can give her biopsy results to her over the phone

## 2025-02-16 ENCOUNTER — HOSPITAL ENCOUNTER (EMERGENCY)
Facility: HOSPITAL | Age: 45
Discharge: HOME | End: 2025-02-16
Attending: EMERGENCY MEDICINE
Payer: COMMERCIAL

## 2025-02-16 VITALS
OXYGEN SATURATION: 100 % | SYSTOLIC BLOOD PRESSURE: 157 MMHG | HEIGHT: 67 IN | RESPIRATION RATE: 20 BRPM | WEIGHT: 175 LBS | BODY MASS INDEX: 27.47 KG/M2 | TEMPERATURE: 97.3 F | HEART RATE: 80 BPM | DIASTOLIC BLOOD PRESSURE: 107 MMHG

## 2025-02-16 DIAGNOSIS — J02.9 PHARYNGITIS, UNSPECIFIED ETIOLOGY: Primary | ICD-10-CM

## 2025-02-16 LAB
FLUAV AG UPPER RESP QL IA.RAPID: POSITIVE
FLUBV AG UPPER RESP QL IA.RAPID: NEGATIVE
S PYO DNA THROAT QL NAA+PROBE: NOT DETECTED
SARS-COV+SARS-COV-2 AG RESP QL IA.RAPID: NEGATIVE

## 2025-02-16 PROCEDURE — 99283 EMERGENCY DEPT VISIT LOW MDM: CPT

## 2025-02-16 PROCEDURE — 87651 STREP A DNA AMP PROBE: CPT

## 2025-02-16 PROCEDURE — 87804 INFLUENZA ASSAY W/OPTIC: CPT

## 2025-02-16 PROCEDURE — 87811 SARS-COV-2 COVID19 W/OPTIC: CPT

## 2025-02-16 PROCEDURE — 99284 EMERGENCY DEPT VISIT MOD MDM: CPT | Performed by: EMERGENCY MEDICINE

## 2025-02-16 RX ORDER — DEXAMETHASONE 4 MG/1
6 TABLET ORAL ONCE
Status: COMPLETED | OUTPATIENT
Start: 2025-02-16 | End: 2025-02-16

## 2025-02-16 RX ORDER — DIPHENHYDRAMINE HYDROCHLORIDE AND LIDOCAINE HYDROCHLORIDE AND ALUMINUM HYDROXIDE AND MAGNESIUM HYDRO
10 KIT EVERY 4 HOURS PRN
Qty: 237 ML | Refills: 0 | Status: SHIPPED | OUTPATIENT
Start: 2025-02-16

## 2025-02-16 RX ORDER — DIPHENHYDRAMINE HYDROCHLORIDE AND LIDOCAINE HYDROCHLORIDE AND ALUMINUM HYDROXIDE AND MAGNESIUM HYDRO
10 KIT ONCE
Status: COMPLETED | OUTPATIENT
Start: 2025-02-16 | End: 2025-02-16

## 2025-02-16 RX ADMIN — DIPHENHYDRAMINE HYDROCHLORIDE AND LIDOCAINE HYDROCHLORIDE AND ALUMINUM HYDROXIDE AND MAGNESIUM HYDRO 10 ML: KIT at 09:17

## 2025-02-16 RX ADMIN — DEXAMETHASONE 6 MG: 4 TABLET ORAL at 09:16

## 2025-02-16 NOTE — DISCHARGE INSTRUCTIONS
You are seen in the emergency department for sore throat.  We provided you with a prescription for pain medication.  Please follow-up with your primary care doctor, and ENT as needed.  If your symptoms worsen or persist, please return to the emergency department immediately.

## 2025-02-16 NOTE — ED PROVIDER NOTES
Time reflects when diagnosis was documented in both MDM as applicable and the Disposition within this note       Time User Action Codes Description Comment    2/16/2025  9:14 AM Sarath Jacques Add [J02.9] Pharyngitis, unspecified etiology           ED Disposition       ED Disposition   Discharge    Condition   Stable    Date/Time   Sun Feb 16, 2025  9:38 AM    Comment   Danuta NEFTALY Healy discharge to home/self care.                   Assessment & Plan       Medical Decision Making  45-year-old female presents to the emergency department with complaint of sore throat, differential diagnosis include COVID, flu, strep pharyngitis, viral pharyngitis, viral panel, and strep swab pending, provide patient with Decadron, Magic mouthwash.    Patient found to be flu A+, discussed this with the patient.    Risk  Prescription drug management.        ED Course as of 02/17/25 0950   Sun Feb 16, 2025   0938 Discussed results with the patient, she will follow up with PCP as needed SRP given. Patient understands and agrees with treatment plan.        Medications   diphenhydramine, lidocaine, Al/Mg hydroxide, simethicone (Magic Mouthwash) oral solution 10 mL (10 mL Swish & Spit Given 2/16/25 0917)   dexamethasone (DECADRON) tablet 6 mg (6 mg Oral Given 2/16/25 0916)       ED Risk Strat Scores                                                History of Present Illness       Chief Complaint   Patient presents with    Sore Throat     Pt arrives with c/o sore throat, congested cough and fever since Wednesday.        Past Medical History:   Diagnosis Date    Epilepsy (HCC)     Seizure (HCC)       Past Surgical History:   Procedure Laterality Date    BREAST BIOPSY Left 03/10/2022    benign - ultrasound guided    HYSTERECTOMY        Family History   Problem Relation Age of Onset    No Known Problems Mother     Skin cancer Father     No Known Problems Sister     No Known Problems Maternal Grandmother     No Known Problems Maternal Grandfather      No Known Problems Paternal Grandmother     No Known Problems Paternal Grandfather     No Known Problems Maternal Aunt     No Known Problems Paternal Aunt       Social History     Tobacco Use    Smoking status: Never     Passive exposure: Never    Smokeless tobacco: Never   Vaping Use    Vaping status: Never Used   Substance Use Topics    Alcohol use: Not Currently    Drug use: Not Currently      E-Cigarette/Vaping    E-Cigarette Use Never User       E-Cigarette/Vaping Substances    Nicotine No     THC No     CBD No     Flavoring No       I have reviewed and agree with the history as documented.     45-year-old female presents to the emergency department with complaint of sore throat going for the past 3 days, chills, subjective fevers, patient denies any chest pain, shortness of breath.  She denies any GI or  complaints.      Sore Throat  Associated symptoms: no abdominal pain, no chest pain, no chills, no cough, no ear pain, no fever, no rash and no shortness of breath        Review of Systems   Constitutional:  Negative for chills and fever.   HENT:  Positive for sore throat. Negative for ear pain.    Eyes:  Negative for pain and visual disturbance.   Respiratory:  Negative for cough and shortness of breath.    Cardiovascular:  Negative for chest pain and palpitations.   Gastrointestinal:  Negative for abdominal pain and vomiting.   Genitourinary:  Negative for dysuria and hematuria.   Musculoskeletal:  Negative for arthralgias and back pain.   Skin:  Negative for color change and rash.   Neurological:  Negative for seizures and syncope.   All other systems reviewed and are negative.          Objective       ED Triage Vitals [02/16/25 0852]   Temperature Pulse Blood Pressure Respirations SpO2 Patient Position - Orthostatic VS   (!) 97.3 °F (36.3 °C) 80 (!) 157/107 20 100 % Sitting      Temp Source Heart Rate Source BP Location FiO2 (%) Pain Score    Temporal Monitor Right arm -- 10 - Worst Possible Pain       Vitals      Date and Time Temp Pulse SpO2 Resp BP Pain Score FACES Pain Rating User   02/16/25 0900 -- -- -- -- 157/107 -- -- MY   02/16/25 0852 97.3 °F (36.3 °C) 80 100 % 20 157/107 10 - Worst Possible Pain -- MD            Physical Exam  Vitals and nursing note reviewed.   Constitutional:       General: She is not in acute distress.     Appearance: She is well-developed.   HENT:      Head: Normocephalic and atraumatic.      Comments: Posterior oropharyngeal erythema, tender submandibular lymph nodes  Eyes:      Conjunctiva/sclera: Conjunctivae normal.   Cardiovascular:      Rate and Rhythm: Normal rate and regular rhythm.      Heart sounds: No murmur heard.  Pulmonary:      Effort: Pulmonary effort is normal. No respiratory distress.      Breath sounds: Normal breath sounds.   Abdominal:      Palpations: Abdomen is soft.      Tenderness: There is no abdominal tenderness.   Musculoskeletal:         General: No swelling.      Cervical back: Neck supple.   Skin:     General: Skin is warm and dry.      Capillary Refill: Capillary refill takes less than 2 seconds.   Neurological:      Mental Status: She is alert.   Psychiatric:         Mood and Affect: Mood normal.         Results Reviewed       Procedure Component Value Units Date/Time    Strep A PCR [[id number]]  (Normal) Collected: 02/16/25 0857    Lab Status: Final result Specimen: Throat Updated: 02/16/25 0930     STREP A PCR Not Detected    FLU/COVID Rapid Antigen (30 min. TAT) - Preferred screening test in ED [[id number]]  (Abnormal) Collected: 02/16/25 0857    Lab Status: Final result Specimen: Nares from Nose Updated: 02/16/25 0922     SARS COV Rapid Antigen Negative     Influenza A Rapid Antigen Positive     Influenza B Rapid Antigen Negative    Narrative:      This test has been performed using the [employer] [facility] 2 FLU+SARS Antigen test under the Emergency Use Authorization (EUA). This test has been validated by the  and verified by the  performing laboratory. The Robina uses lateral flow immunofluorescent sandwich assay to detect SARS-COV, Influenza A and Influenza B Antigen.     The [employer] [facility] 2 SARS Antigen test does not differentiate between SARS-CoV and SARS-CoV-2.     Negative results are presumptive and may be confirmed with a molecular assay, if necessary, for patient management. Negative results do not rule out SARS-CoV-2 or influenza infection and should not be used as the sole basis for treatment or patient management decisions. A negative test result may occur if the level of antigen in a sample is below the limit of detection of this test.     Positive results are indicative of the presence of viral antigens, but do not rule out bacterial infection or co-infection with other viruses.     All test results should be used as an adjunct to clinical observations and other information available to the provider.    FOR PEDIATRIC PATIENTS - copy/paste COVID Guidelines URL to browser: [email or web address]            No orders to display       Procedures    ED Medication and Procedure Management   Prior to Admission Medications   Prescriptions Last Dose Informant Patient Reported? Taking?   lacosamide (VIMPAT) 200 mg tablet   No No   Sig: Take 1 tablet (200 mg total) by mouth every 12 (twelve) hours   multivitamin (THERAGRAN) TABS   Yes No   Sig: Take 1 tablet by mouth daily   nitrofurantoin (MACROBID) 100 mg capsule   No No   Sig: Take 1 capsule (100 mg total) by mouth 2 (two) times a day   Patient not taking: Reported on 8/13/2024      Facility-Administered Medications: None     Discharge Medication List as of 2/16/2025  9:38 AM        START taking these medications    Details   diphenhydramine, lidocaine, Al/Mg hydroxide, simethicone (Magic Mouthwash) SUSP Swish and spit 10 mL every 4 (four) hours as needed for mouth pain or discomfort, Starting Sun 2/16/2025, Normal           CONTINUE  these medications which have NOT CHANGED    Details   lacosamide (VIMPAT) 200 mg tablet Take 1 tablet (200 mg total) by mouth every 12 (twelve) hours, Starting Fri 5/5/2023, Until Tue 6/4/2024, Print      multivitamin (THERAGRAN) TABS Take 1 tablet by mouth daily, Historical Med      nitrofurantoin (MACROBID) 100 mg capsule Take 1 capsule (100 mg total) by mouth 2 (two) times a day, Starting Sun 6/9/2024, Normal           No discharge procedures on file.  ED SEPSIS DOCUMENTATION   Time reflects when diagnosis was documented in both MDM as applicable and the Disposition within this note       Time User Action Codes Description Comment    2/16/2025  9:14 AM Sarath Jacques Add [J02.9] Pharyngitis, unspecified etiology                  Sarath Jacques,   02/17/25 0950

## 2025-03-04 ENCOUNTER — HOSPITAL ENCOUNTER (OUTPATIENT)
Dept: MAMMOGRAPHY | Facility: CLINIC | Age: 45
Discharge: HOME | End: 2025-03-04
Payer: COMMERCIAL

## 2025-03-04 VITALS — HEART RATE: 60 BPM | DIASTOLIC BLOOD PRESSURE: 87 MMHG | SYSTOLIC BLOOD PRESSURE: 130 MMHG

## 2025-03-04 DIAGNOSIS — R92.8 ABNORMAL MAMMOGRAM: ICD-10-CM

## 2025-03-04 PROCEDURE — A4648 IMPLANTABLE TISSUE MARKER: HCPCS

## 2025-03-04 PROCEDURE — 88305 TISSUE EXAM BY PATHOLOGIST: CPT | Performed by: PATHOLOGY

## 2025-03-04 PROCEDURE — 88342 IMHCHEM/IMCYTCHM 1ST ANTB: CPT | Performed by: PATHOLOGY

## 2025-03-04 PROCEDURE — 19081 BX BREAST 1ST LESION STRTCTC: CPT

## 2025-03-04 PROCEDURE — 88341 IMHCHEM/IMCYTCHM EA ADD ANTB: CPT | Performed by: PATHOLOGY

## 2025-03-04 RX ORDER — LIDOCAINE HYDROCHLORIDE AND EPINEPHRINE BITARTRATE 20; .01 MG/ML; MG/ML
10 INJECTION, SOLUTION SUBCUTANEOUS ONCE
Status: COMPLETED | OUTPATIENT
Start: 2025-03-04 | End: 2025-03-04

## 2025-03-04 RX ORDER — LIDOCAINE HYDROCHLORIDE 10 MG/ML
5 INJECTION, SOLUTION EPIDURAL; INFILTRATION; INTRACAUDAL; PERINEURAL ONCE
Status: COMPLETED | OUTPATIENT
Start: 2025-03-04 | End: 2025-03-04

## 2025-03-04 RX ADMIN — LIDOCAINE HYDROCHLORIDE 5 ML: 10 INJECTION, SOLUTION EPIDURAL; INFILTRATION; INTRACAUDAL; PERINEURAL at 12:45

## 2025-03-04 RX ADMIN — LIDOCAINE HYDROCHLORIDE,EPINEPHRINE BITARTRATE 10 ML: 20; .01 INJECTION, SOLUTION INFILTRATION; PERINEURAL at 12:45

## 2025-03-04 NOTE — PROGRESS NOTES
Procedure type:    _____ultrasound guided ___x__stereotactic    Breast:    ___x__Left _____Right    Location: 1 o'clock    Needle: 8g    # of passes: 4 cores all submitted     Clip: Triple twist     Performed by: Dr. Kiran     Pressure held for 5 minutes by: Aguilar Iglesias Strips:    ___X__yes _____no    Band aid: (pressure dressing applied with 4x4 gauze and paper tape)    __X___yes_____no    Tolerated procedure:    __X___yes _____no

## 2025-03-05 NOTE — PROGRESS NOTES
Post procedure call completed on 3/5/25 @ 2812    Bleeding: _____yes __x___no    Pain: _____yes ___x___no    Redness/Swelling: ___x___yes ______no    Band aid removed: __x___yes _____no    Steri-Strips intact: ___x___yes _____no    Pt states her left breast is sore & swollen. Pt denies redness or inflammation at biopsy site.Instructed pt to contact [facility] staff if any concerns.

## 2025-03-06 ENCOUNTER — TELEPHONE (OUTPATIENT)
Dept: MAMMOGRAPHY | Facility: CLINIC | Age: 45
End: 2025-03-06

## 2025-03-06 PROCEDURE — 88341 IMHCHEM/IMCYTCHM EA ADD ANTB: CPT | Performed by: PATHOLOGY

## 2025-03-06 PROCEDURE — 88342 IMHCHEM/IMCYTCHM 1ST ANTB: CPT | Performed by: PATHOLOGY

## 2025-03-06 PROCEDURE — 88305 TISSUE EXAM BY PATHOLOGIST: CPT | Performed by: PATHOLOGY
